# Patient Record
Sex: MALE | Race: BLACK OR AFRICAN AMERICAN | Employment: OTHER | URBAN - METROPOLITAN AREA
[De-identification: names, ages, dates, MRNs, and addresses within clinical notes are randomized per-mention and may not be internally consistent; named-entity substitution may affect disease eponyms.]

---

## 2017-04-04 ENCOUNTER — HOSPITAL ENCOUNTER (OUTPATIENT)
Dept: GENERAL RADIOLOGY | Age: 82
Discharge: HOME | End: 2017-04-04
Attending: UROLOGY | Admitting: UROLOGY

## 2017-04-04 LAB — PROSTATE SPECIFIC ANTIGEN: 3.69 NG/ML (ref 0–4)

## 2020-01-28 LAB
ALBUMIN SERPL-MCNC: 3.9 G/DL
ALP BLD-CCNC: 88 U/L
ALT SERPL-CCNC: 10 U/L
ANION GAP SERPL CALCULATED.3IONS-SCNC: NORMAL MMOL/L
AST SERPL-CCNC: 20 U/L
BILIRUB SERPL-MCNC: 0.6 MG/DL (ref 0.1–1.4)
BUN BLDV-MCNC: 21 MG/DL
CALCIUM SERPL-MCNC: 9.4 MG/DL
CHLORIDE BLD-SCNC: 100 MMOL/L
CHOLESTEROL, TOTAL: 170 MG/DL
CHOLESTEROL/HDL RATIO: NORMAL
CO2: 24 MMOL/L
CREAT SERPL-MCNC: 1.6 MG/DL
CREATININE, URINE: 187.2
GFR CALCULATED: 35
GLUCOSE BLD-MCNC: 144 MG/DL
HDLC SERPL-MCNC: 49 MG/DL (ref 35–70)
LDL CHOLESTEROL CALCULATED: 95 MG/DL (ref 0–160)
MICROALBUMIN/CREAT 24H UR: 183 MG/G{CREAT}
MICROALBUMIN/CREAT UR-RTO: NORMAL
POTASSIUM SERPL-SCNC: 4 MMOL/L
SODIUM BLD-SCNC: 138 MMOL/L
TOTAL PROTEIN: 6.8
TRIGL SERPL-MCNC: 130 MG/DL
VLDLC SERPL CALC-MCNC: 26 MG/DL

## 2020-02-19 RX ORDER — NABUMETONE 500 MG/1
500 TABLET, FILM COATED ORAL 2 TIMES DAILY
COMMUNITY
End: 2020-03-17

## 2020-02-19 RX ORDER — FUROSEMIDE 20 MG/1
1 TABLET ORAL DAILY
COMMUNITY

## 2020-02-19 RX ORDER — LEVOTHYROXINE SODIUM 0.05 MG/1
1 TABLET ORAL DAILY
COMMUNITY

## 2020-02-19 RX ORDER — AMLODIPINE BESYLATE 10 MG/1
10 TABLET ORAL DAILY
COMMUNITY
End: 2020-03-17

## 2020-02-21 ENCOUNTER — TELEPHONE (OUTPATIENT)
Dept: CARDIOLOGY CLINIC | Age: 85
End: 2020-02-21

## 2020-03-09 ENCOUNTER — INITIAL CONSULT (OUTPATIENT)
Dept: CARDIOLOGY CLINIC | Age: 85
End: 2020-03-09
Payer: COMMERCIAL

## 2020-03-09 ENCOUNTER — HOSPITAL ENCOUNTER (EMERGENCY)
Age: 85
Discharge: HOME | End: 2020-03-10
Attending: EMERGENCY MEDICINE
Payer: COMMERCIAL

## 2020-03-09 ENCOUNTER — APPOINTMENT (OUTPATIENT)
Dept: CT IMAGING | Age: 85
End: 2020-03-09
Payer: COMMERCIAL

## 2020-03-09 VITALS
BODY MASS INDEX: 24.05 KG/M2 | SYSTOLIC BLOOD PRESSURE: 150 MMHG | HEART RATE: 75 BPM | WEIGHT: 187.4 LBS | HEIGHT: 74 IN | DIASTOLIC BLOOD PRESSURE: 80 MMHG

## 2020-03-09 PROBLEM — I50.32 CHRONIC DIASTOLIC CONGESTIVE HEART FAILURE (HCC): Status: ACTIVE | Noted: 2020-03-09

## 2020-03-09 PROCEDURE — 93000 ELECTROCARDIOGRAM COMPLETE: CPT | Performed by: INTERNAL MEDICINE

## 2020-03-09 PROCEDURE — 72125 CT NECK SPINE W/O DYE: CPT

## 2020-03-09 PROCEDURE — 6370000000 HC RX 637 (ALT 250 FOR IP): Performed by: EMERGENCY MEDICINE

## 2020-03-09 PROCEDURE — 99284 EMERGENCY DEPT VISIT MOD MDM: CPT

## 2020-03-09 PROCEDURE — 99204 OFFICE O/P NEW MOD 45 MIN: CPT | Performed by: INTERNAL MEDICINE

## 2020-03-09 PROCEDURE — 6370000000 HC RX 637 (ALT 250 FOR IP): Performed by: PHYSICIAN ASSISTANT

## 2020-03-09 PROCEDURE — 70450 CT HEAD/BRAIN W/O DYE: CPT

## 2020-03-09 RX ORDER — CARVEDILOL 12.5 MG/1
12.5 TABLET ORAL 2 TIMES DAILY WITH MEALS
Qty: 60 TABLET | Refills: 3 | Status: SHIPPED | OUTPATIENT
Start: 2020-03-09 | End: 2020-03-10

## 2020-03-09 RX ORDER — CARVEDILOL 6.25 MG/1
6.25 TABLET ORAL 2 TIMES DAILY WITH MEALS
COMMUNITY
End: 2020-03-09

## 2020-03-09 RX ORDER — ACETAMINOPHEN 325 MG/1
650 TABLET ORAL EVERY 6 HOURS PRN
Qty: 120 TABLET | Refills: 0 | Status: SHIPPED | OUTPATIENT
Start: 2020-03-09 | End: 2020-05-04

## 2020-03-09 RX ORDER — LOSARTAN POTASSIUM 100 MG/1
100 TABLET ORAL DAILY
Qty: 90 TABLET | Refills: 2 | Status: SHIPPED | OUTPATIENT
Start: 2020-03-09

## 2020-03-09 RX ORDER — ACETAMINOPHEN 500 MG
1000 TABLET ORAL ONCE
Status: COMPLETED | OUTPATIENT
Start: 2020-03-09 | End: 2020-03-09

## 2020-03-09 RX ORDER — IBUPROFEN 600 MG/1
600 TABLET ORAL ONCE
Status: COMPLETED | OUTPATIENT
Start: 2020-03-09 | End: 2020-03-09

## 2020-03-09 RX ORDER — LOSARTAN POTASSIUM 50 MG/1
50 TABLET ORAL DAILY
COMMUNITY
End: 2020-03-09

## 2020-03-09 RX ORDER — IBUPROFEN 600 MG/1
600 TABLET ORAL 4 TIMES DAILY PRN
Qty: 360 TABLET | Refills: 1 | Status: SHIPPED | OUTPATIENT
Start: 2020-03-09 | End: 2020-05-04

## 2020-03-09 RX ADMIN — ACETAMINOPHEN 1000 MG: 500 TABLET ORAL at 21:25

## 2020-03-09 RX ADMIN — IBUPROFEN 600 MG: 600 TABLET ORAL at 23:21

## 2020-03-09 ASSESSMENT — PAIN DESCRIPTION - PAIN TYPE
TYPE: ACUTE PAIN
TYPE: ACUTE PAIN

## 2020-03-09 ASSESSMENT — PAIN SCALES - GENERAL
PAINLEVEL_OUTOF10: 1
PAINLEVEL_OUTOF10: 10

## 2020-03-09 ASSESSMENT — PAIN DESCRIPTION - LOCATION: LOCATION: HEAD

## 2020-03-09 ASSESSMENT — PAIN DESCRIPTION - ORIENTATION: ORIENTATION: POSTERIOR

## 2020-03-09 NOTE — LETTER
CLINICAL STAFF DOCUMENTATION    Blackman Janice  2/2/1921  [id number]    Have you had any Chest Pain - No    Have you had any Shortness of Breath - Yes  If Yes - When on exertion    Have you had any dizziness - No      Have you had any palpitations - No      Do you have any edema - No    Do you have a surgery or procedure scheduled in the near future - No

## 2020-03-09 NOTE — PROGRESS NOTES
01/28/2020    BILIDIR 1.0 (H) 12/06/2014    BILITOT 0.6 01/28/2020    ALKPHOS 88 01/28/2020     Lab Results   Component Value Date    PROBNP 943.6 (H) 12/08/2017     Lab Results   Component Value Date    LABA1C 5.7 11/30/2014    LABA1C 6.1 03/28/2012     Lab Results   Component Value Date    WBC 7.0 12/08/2017    HGB 12.4 (L) 12/08/2017    HCT 38.1 (L) 12/08/2017     12/08/2017     Echo 1/24/2020/ [place]    Conclusions:    Moderate left ventricular hypertrophy. The ejection fraction, measured in 2D mode, is 61 %. Grade 3 diastolic dysfunction (restrictive LV filling pattern). Moderate to severe enlargement of the left atrium. Mild mitral annular calcification. Mild mitral valve leaflet calcification. No significant regurgitation or stenosis. Aortic valve sclerosis. Mild thickening of the aortic cusps. Moderate aortic cusp calcification. Mild to moderate aortic stenosis. Calculated valve area 1.3 cm2.  No prior echocardiogram available for comparison.              All labs, medications and tests reviewed by myself including data and history from outside source , patient and available family . Assessment & Plan:      1. Hypertension, unspecified type    2. Essential hypertension, benign    3. Chronic diastolic congestive heart failure (HCC)         Essential hypertension, benign  Patient is still quite elevated about advised him to increase his carvedilol 25 mg twice daily and losartan 100 mg daily check BMP in 1 week time after he is increased doses. Echo shows lvh  Keep an eye on blood pressure at home avoid salt call me with numbers in about 1 to 2 weeks    Chronic diastolic congestive heart failure ([address]  He takes Lasix 20 mg every day he seems to be doing all right with that advised to make sure that he takes potassium with check BMP in 1 week time may benefit from Aldactone     Dyslipidemia :  All available lab work was reviewed. Patient was advised to repeat lab work before next visit. Necessary orders were placed , instructions given by myself       Counseled extensively and medication compliance urged. We discussed that for the  prevention of ASCVD our  goal is aggressive risk modification. Patient is encouraged to exercise even a brisk walk for 30 minutes  at least 3 to 4 times a week   Various goals were discussed and questions answered. Continue current medications. Appropriate prescriptions are addressed and refills ordered. Questions answered and patient verbalizes understanding. Call for any problems, questions, or concerns. Continue all other medications of all above medical condition listed as is. Return in about 1 month (around 4/9/2020). Please note this report has been partially produced using speech recognition software and may contain errors related to that system including errors in grammar, punctuation, and spelling, as well as words and phrases that may be inappropriate. If there are any questions or concerns please feel free to contact the dictating provider for clarification.

## 2020-03-10 VITALS
HEART RATE: 69 BPM | HEIGHT: 73 IN | BODY MASS INDEX: 23.99 KG/M2 | TEMPERATURE: 97.6 F | WEIGHT: 181 LBS | SYSTOLIC BLOOD PRESSURE: 178 MMHG | RESPIRATION RATE: 18 BRPM | DIASTOLIC BLOOD PRESSURE: 97 MMHG | OXYGEN SATURATION: 98 %

## 2020-03-10 RX ORDER — CARVEDILOL 12.5 MG/1
12.5 TABLET ORAL 2 TIMES DAILY WITH MEALS
Qty: 180 TABLET | Refills: 3 | Status: SHIPPED | OUTPATIENT
Start: 2020-03-10 | End: 2020-03-17

## 2020-03-10 NOTE — ED PROVIDER NOTES
Emergency [address] EMERGENCY DEPARTMENT    Patient: Nica Anton  MRN: [id number]  : 1921  Date of Evaluation: 3/9/2020  ED Provider: José Miguel Pollock MD    Chief Complaint       Chief Complaint   Patient presents with    Fall     Patient tripped and fell while walking to the bathroom. Having neck pain since. Patient admits to hitting his head. Lakisha Don is a 80 y.o. male who presents to the emergency department after a trip and fall while at home about two hours prior to arrival. No Loc. The patient states that he had no chest pain, palpitations, presyncopal symptoms prior to his fall. He states that he stood up quickly and while he was walking across the room he lost his balance and fell. The patient states that he has a mild headache but denies numbness, weakness, tingling, vision change or hearing change. No nausea or vomiting. No LOC and no nausea or vomiting since his fall. ROS:     At least 10 systems reviewed and otherwise acutely negative except as in the [address] Past History     Past Medical History:   Diagnosis Date    Diabetes mellitus ([address]     Hypertension      Past Surgical History:   Procedure Laterality Date    CHOLECYSTECTOMY  14    attempted lap, open    HERNIA REPAIR      PROSTATE SURGERY       Social History     Socioeconomic History    Marital status:       Spouse name: None    Number of children: None    Years of education: None    Highest education level: None   Occupational History    None   Social Needs    Financial resource strain: None    Food insecurity     Worry: None     Inability: None    Transportation needs     Medical: None     Non-medical: None   Tobacco Use    Smoking status: Never Smoker    Smokeless tobacco: Never Used   Substance and Sexual Activity    Alcohol use: No    Drug use: No    Sexual activity: Not Currently   Lifestyle    Physical activity Days per week: None     Minutes per session: None    Stress: None   Relationships    Social connections     Talks on phone: None     Gets together: None     Attends Evangelical service: None     Active member of club or organization: None     Attends meetings of clubs or organizations: None     Relationship status: None    Intimate partner violence     Fear of current or ex partner: None     Emotionally abused: None     Physically abused: None     Forced sexual activity: None   Other Topics Concern    None   Social History Narrative    None       Medications/Allergies     Previous Medications    AMLODIPINE (NORVASC) 10 MG TABLET    Take 10 mg by mouth daily    BICALUTAMIDE (CASODEX) 50 MG CHEMO TABLET    Take 50 mg by mouth daily. CARVEDILOL (COREG) 12.5 MG TABLET    Take 1 tablet by mouth 2 times daily (with meals)    CHOLECALCIFEROL (VITAMIN D3) 2000 UNITS CAPS    Take 1,000 Units by mouth daily. FUROSEMIDE (LASIX) 20 MG TABLET    Take 1 tablet by mouth daily     GLIMEPIRIDE (AMARYL) 2 MG TABLET    Take 1 mg by mouth every morning (before breakfast). HYDRALAZINE (APRESOLINE) 25 MG TABLET    Take 25 mg by mouth 2 times daily. HYDROCODONE-ACETAMINOPHEN (NORCO) 5-325 MG PER TABLET    Take 1 tablet by mouth every 4 hours as needed for Pain    LEVOTHYROXINE (SYNTHROID) 50 MCG TABLET    Take 1 tablet by mouth Daily     LOSARTAN (COZAAR) 100 MG TABLET    Take 1 tablet by mouth daily    NABUMETONE (RELAFEN) 500 MG TABLET    Take 500 mg by mouth 2 times daily    NEBIVOLOL (BYSTOLIC) 5 MG TABLET    Take 10 mg by mouth daily. POTASSIUM CHLORIDE SA (K-DUR;KLOR-CON M) 20 MEQ TABLET    Take 0.5 tablets by mouth daily for 7 days. REPAGLINIDE (PRANDIN) 1 MG TABLET    Take 0.5 mg by mouth 2 times daily (before meals). SIMVASTATIN (ZOCOR) 20 MG TABLET    Take 20 mg by mouth daily     TAMSULOSIN (FLOMAX) 0.4 MG CAPSULE    Take 0.4 mg by mouth daily.        No Known Allergies     Physical Exam       ED Triage Vitals [03/09/20 2041]   BP Temp Temp Source Pulse Resp SpO2 Height Weight   (!) 204/110 97.6 °F (36.4 °C) Oral 81 18 98 % 6' 1\" (1.854 m) 181 lb (82.1 kg)     GENERAL APPEARANCE: Awake and alert. Cooperative. No acute distress. HEAD: Normocephalic. Atraumatic. EYES: Sclera anicteric. ENT: Tolerates saliva. No trismus. No tenderness to palpation of the C-spine no step-offs or deformities  NECK: Supple. Trachea midline. CARDIO: RRR. Radial pulse 2+. LUNGS: Respirations unlabored. CTAB. ABDOMEN: Soft. Non-distended. Non-tender. EXTREMITIES: No acute deformities. SKIN: Warm and dry. NEUROLOGICAL: No gross facial drooping. Moves all 4 extremities spontaneously. Cranial nerves II through XII are grossly intact, 5 out of 5 strength in the bilateral upper extremities, sensation intact throughout, negative pronator drift, alert and oriented  PSYCHIATRIC: Normal mood. Diagnostics   Labs:  No results found for this visit on 03/09/20. Radiographs:  Ct Head Wo Contrast    Result Date: 3/9/2020  EXAMINATION: CT OF THE HEAD WITHOUT CONTRAST  3/9/2020 9:03 pm TECHNIQUE: CT of the head was performed without the administration of intravenous contrast. Dose modulation, iterative reconstruction, and/or weight based adjustment of the mA/kV was utilized to reduce the radiation dose to as low as reasonably achievable. COMPARISON: None. HISTORY: ORDERING SYSTEM PROVIDED HISTORY: head pain TECHNOLOGIST PROVIDED HISTORY: Has a \"code stroke\" or \"stroke alert\" been called? ->No Reason for exam:->head pain Reason for Exam: falling in bathroom hitting the back of his head. Acuity: Acute Type of Exam: Initial Mechanism of Injury: fall Relevant Medical/Surgical History: no sx FINDINGS: BRAIN/VENTRICLES: There is no acute intracranial hemorrhage, mass effect or midline shift. No abnormal extra-axial fluid collection. The gray-white differentiation is maintained without evidence of an acute infarct.   There is no 80 y.o. male who presented to the emergency department after a mechanical ground-level fall. Patient's physical exam is reassuring with no evidence of trauma at this time and his head CT and C-spine CT are negative. At this time I feel that the patient is safe for discharge home with his family with close follow-up and return precautions. They expressed an understanding and he was discharged home in a stable condition. I did recommend they follow-up with his primary care given that he had the fall for balance assessment and to ensure that he has the safest home environment possible. ED Medication Orders (From admission, onward)    Start Ordered     Status Ordering Provider    03/09/20 2315 03/09/20 2306  ibuprofen (ADVIL;MOTRIN) tablet 600 mg  ONCE      Last MAR action:  Given - by Jaye Limon on 03/09/20 at [address] [facility]    03/09/20 2100 03/09/20 2047  acetaminophen (TYLENOL) tablet 1,000 mg  ONCE      Last MAR action:  Given - by Jaye Limon on 03/09/20 at [address] [facility] [place]          Final Impression      1. Fall, initial encounter    2. Closed head injury, initial encounter    3.  Neck sprain, initial encounter      DISPOSITION Decision To Discharge 03/09/2020 11:43:31 PM     (Please note that portions of this note may have been completed with a voice recognition program. Efforts were made to edit the dictations but occasionally words are mis-transcribed.)    Damir Parish MD  [id number] MD  03/13/20 1733

## 2020-03-10 NOTE — ED PROVIDER NOTES
As provider-in-triage, I performed a medical screening history and physical exam on this patient. HISTORY OF PRESENT ILLNESS  Kajal Acuna is a 80 y.o. male who presents for fall, head injury. States he stood up to fast and fell trying to get to the bathroom. Notes head and neck pain only. No LOC. Not on thinners. Acting normally per family. PHYSICAL EXAM  BP (!) 204/110   Pulse 81   Temp 97.6 °F (36.4 °C) (Oral)   Resp 18   Ht 6' 1\" (1.854 m)   Wt 181 lb (82.1 kg)   SpO2 98%   BMI 23.88 kg/m²     On exam, the patient appears well-hydrated, well-nourished, and in no acute distress. Mucous membranes are moist. Speech is clear. Breathing is unlabored. Skin is dry. Mental status is normal. Moves all extremities, and is without facial droop. Comment: Please note this report has been produced using speech recognition software and may contain errors related to that system including errors in grammar, punctuation, and spelling, as well as words and phrases that may be inappropriate. If there are any questions or concerns please feel free to contact the dictating provider for clarification.        [address] [facility]  03/09/20 2868

## 2020-03-16 ENCOUNTER — TELEPHONE (OUTPATIENT)
Dept: CARDIOLOGY CLINIC | Age: 85
End: 2020-03-16

## 2020-03-16 NOTE — TELEPHONE ENCOUNTER
Pt daughter called and states pt bp med was increased. She was to call if bp stayed in over 150 top number. It is staying in 160 or higher please advise.  Call [phone] Cyrus Montes

## 2020-03-17 RX ORDER — CARVEDILOL 25 MG/1
25 TABLET ORAL 2 TIMES DAILY WITH MEALS
Qty: 60 TABLET | Refills: 3 | Status: SHIPPED | OUTPATIENT
Start: 2020-03-17 | End: 2020-03-18

## 2020-03-17 RX ORDER — NIFEDIPINE 30 MG/1
30 TABLET, EXTENDED RELEASE ORAL DAILY
Qty: 30 TABLET | Refills: 2 | Status: SHIPPED | OUTPATIENT
Start: 2020-03-17 | End: 2020-03-18

## 2020-03-17 NOTE — TELEPHONE ENCOUNTER
Increase coreg 25 mg bid  Start nifedipne Xr 30 Mg daily   Stay at home / avoid public exposure  Call us with bp in 1 -2 weeks

## 2020-03-20 RX ORDER — CARVEDILOL 25 MG/1
25 TABLET ORAL 2 TIMES DAILY WITH MEALS
Qty: 180 TABLET | Refills: 3 | Status: ON HOLD | OUTPATIENT
Start: 2020-03-20 | End: 2021-03-29 | Stop reason: SDUPTHER

## 2020-03-20 RX ORDER — NIFEDIPINE 30 MG/1
30 TABLET, EXTENDED RELEASE ORAL DAILY
Qty: 90 TABLET | Refills: 3 | Status: ON HOLD | OUTPATIENT
Start: 2020-03-20 | End: 2021-03-29 | Stop reason: HOSPADM

## 2020-05-04 ENCOUNTER — OFFICE VISIT (OUTPATIENT)
Dept: CARDIOLOGY CLINIC | Age: 85
End: 2020-05-04
Payer: COMMERCIAL

## 2020-05-04 VITALS
HEIGHT: 73 IN | HEART RATE: 60 BPM | WEIGHT: 185 LBS | SYSTOLIC BLOOD PRESSURE: 106 MMHG | DIASTOLIC BLOOD PRESSURE: 60 MMHG | BODY MASS INDEX: 24.52 KG/M2

## 2020-05-04 PROCEDURE — 99213 OFFICE O/P EST LOW 20 MIN: CPT | Performed by: INTERNAL MEDICINE

## 2020-05-04 NOTE — LETTER
Eliceo Montenegro  2/2/1921  [id number]    Have you had any Chest Pain - No    Have you had any Shortness of Breath - No    Have you had any dizziness - No    Have you had any palpitations - No     Do you have any edema - No    Ask patient if they want to sign up for MyChart if they are not already signed up    Check to see if we have an E-MAIL on file for the patient    Check medication list thoroughly!!!  BE SURE TO ASK PATIENT IF THEY NEED MEDICATION REFILLS

## 2020-05-04 NOTE — PROGRESS NOTES
pertinent family history. Social History     Tobacco Use    Smoking status: Never Smoker    Smokeless tobacco: Never Used   Substance Use Topics    Alcohol use: No        Review of Systems:   · Constitutional: No Fever or Weight Loss   · Eyes: No Decreased Vision  · ENT: No Headaches, Hearing Loss or Vertigo  · Cardiovascular: as per note above   · Respiratory: No cough or wheezing and as per note above. · Gastrointestinal: No abdominal pain, appetite loss, blood in stools, constipation, diarrhea or heartburn  · Genitourinary: No dysuria, trouble voiding, or hematuria  · Musculoskeletal:  denies any new  joint aches , swelling  or pain   · Integumentary: No rash or pruritis  · Neurological: No TIA or stroke symptoms  · Psychiatric: No anxiety or depression  · Endocrine: No malaise, fatigue or temperature intolerance  · Hematologic/Lymphatic: No bleeding problems, blood clots or swollen lymph nodes  · Allergic/Immunologic: No nasal congestion or hives    Objective:      Physical Exam:  /60   Pulse 60   Ht 6' 1\" (1.854 m)   Wt 185 lb (83.9 kg)   BMI 24.41 kg/m²   Wt Readings from Last 3 Encounters:   05/04/20 185 lb (83.9 kg)   03/09/20 181 lb (82.1 kg)   03/09/20 187 lb 6.4 oz (85 kg)     Body mass index is 24.41 kg/m². Vitals:    05/04/20 1404   BP: 106/60   Pulse: 60        General Appearance:  No distress, conversant  Constitutional:  Well developed, Well nourished, No acute distress, Non-toxic appearance. HENT:  Normocephalic, Atraumatic, Bilateral external ears normal, Oropharynx moist, No oral exudates, Nose normal. Neck- Normal range of motion, No tenderness, Supple, No stridor,no apical-carotid delay  Eyes:  PERRL, EOMI, Conjunctiva normal, No discharge. Respiratory:  Normal breath sounds, No respiratory distress, No wheezing, No chest tenderness. ,no use of accessory muscles, NO crackles  Cardiovascular: (PMI) apex non displaced,no lifts no thrills,S1 and S2 audible, No added heart mitral valve leaflet calcification. No significant regurgitation or stenosis. Aortic valve sclerosis. Mild thickening of the aortic cusps. Moderate aortic cusp calcification. Mild to moderate aortic stenosis. Calculated valve area 1.3 cm2.  No prior echocardiogram available for comparison.          All labs, medications and tests reviewed by myself including data and history from outside source , patient and available family . Assessment & Plan:      1. Essential hypertension, benign    2. Chronic diastolic congestive heart failure ([address]    3. Acute cholecystitis         Essential hypertension, benign  Patient is still quite elevated about advised him to increase his carvedilol 25 mg twice daily and losartan 100 mg daily check BMP in 1 week time after he is increased doses. Echo shows lvh  Keep an eye on blood pressure at home avoid salt call me with numbers in about 1 to 2 weeks    Chronic diastolic congestive heart failure ([facility]  He takes Lasix 20 mg every day he seems to be doing all right with that advised to make sure that he takes potassium with check BMP in 1 week time may benefit from Aldactone     Dyslipidemia :  All available lab work was reviewed. Patient was advised to repeat lab work before next visit. Necessary orders were placed , instructions given by myself       Counseled extensively and medication compliance urged. We discussed that for the  prevention of ASCVD our  goal is aggressive risk modification. Patient is encouraged to exercise even a brisk walk for 30 minutes  at least 3 to 4 times a week   Various goals were discussed and questions answered. Continue current medications. Appropriate prescriptions are addressed and refills ordered. Questions answered and patient verbalizes understanding. Call for any problems, questions, or concerns. Continue all other medications of all above medical condition listed as is. Return in about 6 months (around 11/4/2020).     Please note this

## 2020-09-11 ENCOUNTER — HOSPITAL ENCOUNTER (EMERGENCY)
Age: 85
Discharge: HOME | End: 2020-09-11
Attending: EMERGENCY MEDICINE
Payer: COMMERCIAL

## 2020-09-11 ENCOUNTER — APPOINTMENT (OUTPATIENT)
Dept: CT IMAGING | Age: 85
End: 2020-09-11
Payer: COMMERCIAL

## 2020-09-11 VITALS
DIASTOLIC BLOOD PRESSURE: 87 MMHG | RESPIRATION RATE: 16 BRPM | OXYGEN SATURATION: 98 % | TEMPERATURE: 98.2 F | HEART RATE: 58 BPM | SYSTOLIC BLOOD PRESSURE: 175 MMHG

## 2020-09-11 PROCEDURE — 93005 ELECTROCARDIOGRAM TRACING: CPT | Performed by: EMERGENCY MEDICINE

## 2020-09-11 PROCEDURE — 72125 CT NECK SPINE W/O DYE: CPT

## 2020-09-11 PROCEDURE — 99284 EMERGENCY DEPT VISIT MOD MDM: CPT

## 2020-09-11 PROCEDURE — 6370000000 HC RX 637 (ALT 250 FOR IP): Performed by: EMERGENCY MEDICINE

## 2020-09-11 RX ORDER — LIDOCAINE 4 G/G
1 PATCH TOPICAL DAILY
Qty: 10 PATCH | Refills: 0 | Status: SHIPPED | OUTPATIENT
Start: 2020-09-11 | End: 2020-09-21

## 2020-09-11 RX ORDER — LIDOCAINE 4 G/G
1 PATCH TOPICAL DAILY
Status: DISCONTINUED | OUTPATIENT
Start: 2020-09-11 | End: 2020-09-11 | Stop reason: HOSPADM

## 2020-09-11 ASSESSMENT — PAIN DESCRIPTION - PAIN TYPE: TYPE: ACUTE PAIN

## 2020-09-11 ASSESSMENT — PAIN SCALES - GENERAL: PAINLEVEL_OUTOF10: 5

## 2020-09-11 ASSESSMENT — PAIN DESCRIPTION - LOCATION: LOCATION: NECK

## 2020-09-11 NOTE — ED PROVIDER NOTES
Emergency Department Encounter    Patient: Madison Shepherd  MRN: [id number]  : 1921  Date of Evaluation: 2020  ED Provider:  Hilario Martin    Triage Chief Complaint:   Neck Pain (ongoing since fall in march)      [place]:  Madison Shepherd is a 80 y.o. male that presents to the emergency department for evaluation of left-sided neck pain. He is accompanied by his daughter who is at bedside and provides additional history. Patient notes that in March he had a mechanical fall while at home. He was walking across his home when he lost balance and fell. He did sustain blunt head trauma at that time. He was evaluated in our emergency department. CT brain and cervical spine were obtained and were essentially unremarkable. Patient was discharged in hemodynamically stable condition. Over the past 6 months, patient has had waxing and waning pain in the left side of his neck. He denies any radiating symptoms to the extremities, back, chest. He states that pain is worse in the morning and gets better throughout the day. He does describe improvement in symptoms with movement. He is able to move his neck and full range of motion. Daughter applies Biofreeze which makes the pain better as well. Patient denies pain or injury elsewhere. Denies chest pain, shortness of breath, pleuritic pain. Denies abdominal pain, nausea, vomiting, diarrhea, constipation, hematochezia, melena. Denies syncope, dizziness, lightheadedness, numbness, tingling, weakness, paresthesias. Denies loss of bowel bladder function or urinary retention. Denies saddle anesthesia. Denies additional precipitating, modifying, alleviating factors. He denies any repeat trauma. ROS - see HPI, below listed is current ROS at time of my eval:  A complete 10 point review of systems was performed and is as dictated above, otherwise negative.      Past Medical History:   Diagnosis Date    Diabetes mellitus ([address]     Hypertension        Past Surgical History:   Procedure Laterality Date    CHOLECYSTECTOMY  12/2/14    attempted lap, open    HERNIA REPAIR      PROSTATE SURGERY         History reviewed. No pertinent family history. Social History     Socioeconomic History    Marital status:       Spouse name: Not on file    Number of children: Not on file    Years of education: Not on file    Highest education level: Not on file   Occupational History    Not on file   Social Needs    Financial resource strain: Not on file    Food insecurity     Worry: Not on file     Inability: Not on file    Transportation needs     Medical: Not on file     Non-medical: Not on file   Tobacco Use    Smoking status: Never Smoker    Smokeless tobacco: Never Used   Substance and Sexual Activity    Alcohol use: No    Drug use: No    Sexual activity: Not Currently   Lifestyle    Physical activity     Days per week: Not on file     Minutes per session: Not on file    Stress: Not on file   Relationships    Social connections     Talks on phone: Not on file     Gets together: Not on file     Attends Oriental orthodox service: Not on file     Active member of club or organization: Not on file     Attends meetings of clubs or organizations: Not on file     Relationship status: Not on file    Intimate partner violence     Fear of current or ex partner: Not on file     Emotionally abused: Not on file     Physically abused: Not on file     Forced sexual activity: Not on file   Other Topics Concern    Not on file   Social History Narrative    Not on file       Current Facility-Administered Medications   Medication Dose Route Frequency Provider Last Rate Last Dose    lidocaine 4 % external patch 1 patch  1 patch Transdermal Daily Henry Eduardo DO   1 patch at 09/11/20 0979     Current Outpatient Medications   Medication Sig Dispense Refill    lidocaine 4 % external patch Place 1 patch onto the skin daily for 10 days 10 patch 0    carvedilol (COREG) 25 MG tablet Take 1 tablet by mouth 2 times daily (with meals) 180 tablet 3    NIFEdipine (PROCARDIA XL) 30 MG extended release tablet Take 1 tablet by mouth daily 90 tablet 3    losartan (COZAAR) 100 MG tablet Take 1 tablet by mouth daily 90 tablet 2    furosemide (LASIX) 20 MG tablet Take 1 tablet by mouth daily       levothyroxine (SYNTHROID) 50 MCG tablet Take 1 tablet by mouth Daily       Cholecalciferol (VITAMIN D3) 2000 UNITS CAPS Take 1,000 Units by mouth daily.  simvastatin (ZOCOR) 20 MG tablet Take 20 mg by mouth daily       tamsulosin (FLOMAX) 0.4 MG capsule Take 0.4 mg by mouth daily. No Known Allergies    Nursing Notes Reviewed    Physical Exam:  Triage VS:    ED Triage Vitals [09/11/20 1448]   Enc Vitals Group      BP (!) 158/75      Pulse 61      Resp 16      Temp 98.2 °F (36.8 °C)      SpO2 98 %       My pulse ox interpretation is - normal    General appearance:  Patient is awake, alert, oriented. Following commands and answering questions. GCS is 15. Non-toxic in appearance. Skin:  Warm. Dry. Intact. No rashes, petechiae, purpura. No herpes zoster lesions. Eye:  Pupils are equal, round, reactive. Extraocular movements intact. No nystagmus. No gaze deviation. Head, ears, nose, mouth and throat:  Head is normocephalic, atraumatic. No external masses or lesions. No nasal drainage. Pharynx is clear, non-erythematous. Airway is patent. Mucous membranes are moist.   Neck:  Supple. No nuchal rigidity. Trachea midline. No masses, thyromegaly or lymphadenopathy. No JVD. No carotid thrills or bruits. Examination of the cervical spine demonstrates paraspinal muscle tenderness in the lower cervical spine. There are no step-offs or deformities. Extremity:  No clubbing, cyanosis, or edema. No joint swelling. Normal muscle tone. Full range of motion in extremities. Patient is able to move bilateral shoulders, elbows, wrists, fingers and full range of motion.   Heart:  Regular rate and regular rhythm. Audible S1 & S2. No audible murmurs, rubs, or gallops. Perfusion:  Symmetric peripheral pulses. Brisk capillary refill. Respiratory:  Lungs clear to auscultation bilaterally. No rales, rhonchi or wheezes. Respirations nonlabored. Abdominal:  Soft. Nontender. Non distended. Normal bowel sounds. No midline pulsatile abdominal masses, thrills or bruits. Back:  No CVA tenderness to palpation. Apart from the cervical spine, patient has no tenderness in the thoracic or lumbar spine. There are no step-offs or deformities. No clinical signs of cauda equina syndrome. Neurological:  Alert and oriented times 3. No focal or lateralizing neurological deficits. Sensation is grossly intact to light touch and two-point discrimination in the C5-T1 dermatomal distribution of bilateral upper extremities. 5 out of 5 strength in muscles innervated by the radial, median, ulnar, AIN, PIN nerves. 2/4 biceps, triceps, brachioradialis reflexes. Psychiatric: Cooperative.       *I have reviewed and interpreted all of the currently available results from this visit*    Labs  Results for orders placed or performed during the hospital encounter of 09/11/20   EKG 12 Lead   Result Value Ref Range    Ventricular Rate 63 BPM    Atrial Rate 63 BPM    P-R Interval 232 ms    QRS Duration 88 ms    Q-T Interval 428 ms    QTc Calculation (Bazett) 437 ms    P Axis 73 degrees    R Axis -41 degrees    T Axis 31 degrees    Diagnosis       Sinus rhythm with 1st degree AV block  Left axis deviation  Septal infarct , age undetermined  Abnormal ECG  When compared with ECG of 08-DEC-2017 10:45,  Septal infarct is now present          Radiographs:  Ct Cervical Spine Wo Contrast    Result Date: 9/11/2020  EXAMINATION: CT OF THE CERVICAL SPINE WITHOUT CONTRAST 9/11/2020 5:24 pm TECHNIQUE: CT of the cervical spine was performed without the administration of intravenous contrast. Multiplanar reformatted images are provided for review. Dose modulation, iterative reconstruction, and/or weight based adjustment of the mA/kV was utilized to reduce the radiation dose to as low as reasonably achievable. COMPARISON: 03/09/2020 HISTORY: ORDERING SYSTEM PROVIDED HISTORY: left neck pain TECHNOLOGIST PROVIDED HISTORY: Reason for exam:->left neck pain Reason for Exam: left side neck pain Acuity: Acute Type of Exam: Initial Additional signs and symptoms: fell in march and neck still hurts FINDINGS: BONES/ALIGNMENT: There is no acute fracture or suspect osseous lesion. Vertebral body heights are maintained. There are confluent anterior osteophytes from C2-3 through the visualized upper thoracic spine compatible with diffuse idiopathic skeletal hyperostosis. Alignment is normal. DEGENERATIVE CHANGES: Mild multilevel degenerative changes unchanged from prior study. SOFT TISSUES: No acute paraspinal soft tissue abnormality. No acute osseous abnormality of the cervical spine. EKG: (All EKG's are interpreted by myself in the absence of a cardiologist) EKG performed on 9/11/2020 at 1726 demonstrates ventricular rate of 63 bpm in sinus rhythm. Patient does have first-degree AV block. Left axis deviation. No signs of acute ischemia, infarct, high degree heart block. Intervals are otherwise within normal limits. MDM:  Patient was seen and evaluated in the emergency department by myself. A thorough history and physical exam were performed, prior medical records were reviewed. Upon arrival to the emergency department, patient's vital signs were noted. He was hypertensive with a blood pressure of 175/87. Heart rate was slightly bradycardic at 58. No tachypnea or hypoxia. Patient was afebrile. Patient was connected to continuous cardiopulmonary monitoring. Rhythm strip interpreted by myself demonstrating sinus rhythm. EKG was performed, interpreted by myself, as detailed above. Differential diagnoses and treatment plan were discussed. Lidoderm patch was applied for pain. CT cervical spine was obtained and reviewed by myself. Radiology report reads no acute osseous abnormality of the cervical spine. On repeat evaluations, patient remains hemodynamically stable. Reports improvement in symptoms with therapy provided in the ED. Results of radiographic data along with differential diagnoses and treatment plan were discussed with the patient. Patient presents with neck pain. No recent traumatic injuries. There is no radiculopathy. No numbness, tingling, weakness in the upper extremities. No neurologic deficits. Patient denies any chest pain. He does note improvement in symptoms with neck motion and Lidoderm patch. He feels better and seeks discharge. He does have an appointment with his primary care physician Dr. Anny Mcclellan on 9/15/2020. Instructed to address elevated blood pressure at follow-up appointment. Patient has not taken his evening blood pressure medications. Instructed that if neck pain persists or worsen he may require MRI of his neck for further evaluation. No red flag signs or symptoms to warrant emergent MRI. Instructed to return to emergency room immediately with any new, worsening, concerning symptoms. Daughter is at bedside and is updated as well. Patient is provided prescription for Lidoderm patch. Side effect profile this medication is discussed. Daughter declines muscle relaxants or narcotic pain medications. Patient is discharged in hemodynamically stable condition. Clinical Impressions:  1. Cervicalgia    2. History of fall    3. Accelerated hypertension        Disposition referral:  Cammie Arriaga MD  [address] [place] [address],[address]  [phone]    On 9/15/2020  Please follow-up with your primary care physician Dr. Anny Mcclellan for reevaluation as scheduled. If symptoms persist or worsen you may require MRI.   Please address elevated blood pressure at follow-up appointment    [facility]) [facility] Emergency Department  [address] [id number]  [phone]  Go to   Immediately with any new, worsening, concerning symptoms. Disposition medications:  New Prescriptions    LIDOCAINE 4 % EXTERNAL PATCH    Place 1 patch onto the skin daily for 10 days       ED Provider Disposition  DISPOSITION: Discharge        Comment: Please note this report has been produced using speech recognition software and may contain errors related to that system including errors in grammar, punctuation, and spelling, as well as words and phrases that may be inappropriate. Efforts were made to edit the dictations.        [facility],   09/11/20 1421

## 2020-09-11 NOTE — ED NOTES
Pt reports neck pain, pain level 8. HX: Fall in March resulting in neck pain.         Lita Benavides RN  09/11/20 8869

## 2020-09-11 NOTE — ED NOTES
Reviewed discharge instructions and prescription with pt, pt V/U, discharged home with daughter.      Elizabeth Camp RN  09/11/20 9950

## 2020-09-12 PROCEDURE — 93010 ELECTROCARDIOGRAM REPORT: CPT | Performed by: INTERNAL MEDICINE

## 2020-09-16 LAB
EKG ATRIAL RATE: 63 BPM
EKG DIAGNOSIS: NORMAL
EKG P AXIS: 73 DEGREES
EKG P-R INTERVAL: 232 MS
EKG Q-T INTERVAL: 428 MS
EKG QRS DURATION: 88 MS
EKG QTC CALCULATION (BAZETT): 437 MS
EKG R AXIS: -41 DEGREES
EKG T AXIS: 31 DEGREES
EKG VENTRICULAR RATE: 63 BPM

## 2020-10-18 ENCOUNTER — APPOINTMENT (OUTPATIENT)
Dept: GENERAL RADIOLOGY | Age: 85
End: 2020-10-18
Payer: COMMERCIAL

## 2020-10-18 VITALS
BODY MASS INDEX: 24.64 KG/M2 | HEART RATE: 81 BPM | TEMPERATURE: 97.7 F | DIASTOLIC BLOOD PRESSURE: 112 MMHG | RESPIRATION RATE: 16 BRPM | OXYGEN SATURATION: 100 % | HEIGHT: 74 IN | SYSTOLIC BLOOD PRESSURE: 188 MMHG | WEIGHT: 192 LBS

## 2020-10-18 PROCEDURE — 84484 ASSAY OF TROPONIN QUANT: CPT

## 2020-10-18 PROCEDURE — 71045 X-RAY EXAM CHEST 1 VIEW: CPT

## 2020-10-18 RX ORDER — LABETALOL HYDROCHLORIDE 5 MG/ML
5 INJECTION, SOLUTION INTRAVENOUS ONCE
Status: DISCONTINUED | OUTPATIENT
Start: 2020-10-18 | End: 2020-10-18

## 2020-10-18 RX ORDER — LABETALOL HYDROCHLORIDE 5 MG/ML
10 INJECTION, SOLUTION INTRAVENOUS ONCE
Status: DISCONTINUED | OUTPATIENT
Start: 2020-10-18 | End: 2020-10-18

## 2020-10-19 ENCOUNTER — HOSPITAL ENCOUNTER (EMERGENCY)
Age: 85
Discharge: HOME | End: 2020-10-19
Attending: EMERGENCY MEDICINE
Payer: COMMERCIAL

## 2020-10-19 PROCEDURE — 99284 EMERGENCY DEPT VISIT MOD MDM: CPT

## 2020-10-19 NOTE — ED PROVIDER NOTES
Triage Chief Complaint:   Hypertension (family reports patients bp was 197/97 at 2100 tonight and has steadily ran high since earlier today. Denies any pain or symptoms.)    [place]:  Narciso Magana is a 80 y.o. male that presents with his daughter for hypertension. Patient denies any other symptoms. He has some muscular neck pain from a fall about a week ago. Denies any recent changes in blood pressure medications. He routinely checks his blood pressures and they have been running higher at home. Denies chest pain, shortness of breath, abdominal pain, nausea, vomiting, diarrhea, fever, cough, headache, vision changes. ROS:  At least 10 systems reviewed and otherwise acutely negative except as in the [address] Past Medical History:   Diagnosis Date    Diabetes mellitus ([facility] [address]     Hypertension      Past Surgical History:   Procedure Laterality Date    CHOLECYSTECTOMY  12/2/14    attempted lap, open    HERNIA REPAIR      PROSTATE SURGERY       No family history on file. Social History     Socioeconomic History    Marital status:       Spouse name: Not on file    Number of children: Not on file    Years of education: Not on file    Highest education level: Not on file   Occupational History    Not on file   Social Needs    Financial resource strain: Not on file    Food insecurity     Worry: Not on file     Inability: Not on file    Transportation needs     Medical: Not on file     Non-medical: Not on file   Tobacco Use    Smoking status: Never Smoker    Smokeless tobacco: Never Used   Substance and Sexual Activity    Alcohol use: No    Drug use: No    Sexual activity: Not Currently   Lifestyle    Physical activity     Days per week: Not on file     Minutes per session: Not on file    Stress: Not on file   Relationships    Social connections     Talks on phone: Not on file     Gets together: Not on file     Attends Jewish service: Not on file     Active member of club or organization: Not on file     Attends meetings of clubs or organizations: Not on file     Relationship status: Not on file    Intimate partner violence     Fear of current or ex partner: Not on file     Emotionally abused: Not on file     Physically abused: Not on file     Forced sexual activity: Not on file   Other Topics Concern    Not on file   Social History Narrative    Not on file     No current facility-administered medications for this encounter. Current Outpatient Medications   Medication Sig Dispense Refill    carvedilol (COREG) 25 MG tablet Take 1 tablet by mouth 2 times daily (with meals) 180 tablet 3    NIFEdipine (PROCARDIA XL) 30 MG extended release tablet Take 1 tablet by mouth daily 90 tablet 3    losartan (COZAAR) 100 MG tablet Take 1 tablet by mouth daily 90 tablet 2    furosemide (LASIX) 20 MG tablet Take 1 tablet by mouth daily       levothyroxine (SYNTHROID) 50 MCG tablet Take 1 tablet by mouth Daily       Cholecalciferol (VITAMIN D3) 2000 UNITS CAPS Take 1,000 Units by mouth daily.  simvastatin (ZOCOR) 20 MG tablet Take 20 mg by mouth daily       tamsulosin (FLOMAX) 0.4 MG capsule Take 0.4 mg by mouth daily. No Known Allergies    Nursing Notes Reviewed    Physical Exam:  ED Triage Vitals [10/18/20 2314]   Enc Vitals Group      BP (!) 188/112      Pulse 81      Resp 16      Temp 97.7 °F (36.5 °C)      Temp Source Skin      SpO2 100 %      Weight 192 lb (87.1 kg)      Height 6' 2\" (1.88 m)      Head Circumference       Peak Flow       Pain Score       Pain Loc       Pain Edu? Excl. in [address]? GENERAL APPEARANCE: Awake and alert. Cooperative. No acute distress. HEAD: Normocephalic. Atraumatic. EYES: EOM's grossly intact. Sclera anicteric. ENT: Mucous membranes are moist. Tolerates saliva. No trismus. NECK: No meningismus. HEART:  Extremities pink  LUNGS: Respirations unlabored. Even chest rise bilaterally  ABDOMEN: Non distended.    EXTREMITIES: No acute deformities. SKIN: Dry  NEUROLOGICAL: No gross facial drooping. Moves all 4 extremities spontaneously. PSYCHIATRIC: Normal mood. I have reviewed and interpreted all of the currently available lab results from this visit (if applicable):  No results found for this visit on 10/19/20. Radiographs (if obtained):  [] The following radiograph was interpreted by myself in the absence of a radiologist:  [x] Radiologist's Report Reviewed:    EKG (if obtained): (All EKG's are interpreted by myself in the absence of a cardiologist)  Sinus rhythm with first-degree AV block. Left axis deviation. No specific ST or T wave changes. No pathologic Q waves. QTc is normal.    MDM:  Plan of care is discussed thoroughly with the patient and family if present. If performed, all imaging and lab work also discussed with patient. All relevant prior results and chart reviewed if available. Patient presents with asymptomatic hypertension. The patient has no other complaints at this time consistent with an acute life-threatening emergency. Patient does not have any chest pain, shortness of breath, abdominal pain, hematuria, neurologic deficits. No indication for acute antihypertensive therapy as risks greatly outweigh benefits. I did encourage the patient to follow up closely with primary care physician in keep a daily log of blood pressures at home so that outpatient medications can be adjusted/initiated as needed by primary care physician. Patient verbalized understanding and agrees to return if any symptoms such as those listed above occurred. Clinical Impression:  1.  Asymptomatic hypertension      (Please note that portions of this note may have been completed with a voice recognition program. Efforts were made to edit the dictations but occasionally words are mis-transcribed.)    MD Miladys Wisdom MD  10/19/20 María Elena Diaz MD  10/19/20 María Elena Diaz MD  10/19/20 0124

## 2020-10-19 NOTE — ED NOTES
Pt being seen by Dr. Yessica Ramirez in virtual PIT at this time      Sonja Lofton, RN  10/18/20 1336

## 2020-10-19 NOTE — ED TRIAGE NOTES
Daughter reports patient has had elevated blood pressures for the past few hours. Patient denies any symptoms or pain at this time.

## 2020-10-19 NOTE — ED PROVIDER NOTES
As physician-in-triage, I performed a medical screening history and physical exam on this patient. HISTORY OF PRESENT ILLNESS  Marleny Murdock is a 80 y.o. male presents emergency department with chief complaint of hypertension. Patient does not report any symptoms. Patient reports blood pressure was 197/97 this evening. Patient reports blood pressure has been elevated all day. Patient is on multiple medications from his primary care physician for his hypertension. Patient will be seen by physicians at [facility]. PHYSICAL EXAM  BP (!) 188/112   Pulse 81   Temp 97.7 °F (36.5 °C) (Skin)   Resp 16   Ht 6' 2\" (1.88 m)   Wt 192 lb (87.1 kg)   SpO2 100%   BMI 24.65 kg/m²     On exam, the patient appears in no acute distress. Speech is clear. Breathing is unlabored. Moves all extremities    Comment: Please note this report has been produced using speech recognition software and may contain errors related to that system including errors in grammar, punctuation, and spelling, as well as words and phrases that may be inappropriate. If there are any questions or concerns please feel free to contact the dictating provider for clarification.        Noreen Dickson, DO  10/18/20 800 Kerwin Burnham, DO  10/18/20 0807

## 2020-11-09 ENCOUNTER — OFFICE VISIT (OUTPATIENT)
Dept: CARDIOLOGY CLINIC | Age: 85
End: 2020-11-09
Payer: COMMERCIAL

## 2020-11-09 VITALS
SYSTOLIC BLOOD PRESSURE: 102 MMHG | BODY MASS INDEX: 24.79 KG/M2 | TEMPERATURE: 97.2 F | HEIGHT: 74 IN | HEART RATE: 76 BPM | DIASTOLIC BLOOD PRESSURE: 52 MMHG | WEIGHT: 193.2 LBS | OXYGEN SATURATION: 95 %

## 2020-11-09 PROCEDURE — 99214 OFFICE O/P EST MOD 30 MIN: CPT | Performed by: NURSE PRACTITIONER

## 2020-11-09 NOTE — PROGRESS NOTES
Stephan Rivera  [facility] [address]  Phone: [phone]    Fax [phone]                  Moises Garcia MD, Marcos Trejo MD, Walt Habermann, MD, Sim Russel, MD Floyce Sandifer, MD Elner Genera, MD Lionell Pipes, APRKIANNA Moore, APRKIANNA Rascon, APRKIANNA Richardson, APRN    CARDIOLOGY  NOTE      11/9/2020    RE: Catalina Walters  (2/2/1921)                               TO:  Dr. Silvestre Phan MD  The primary cardiologist is Dr. Ludin Whitfield    CC:   1. Chronic diastolic congestive heart failure ([facility] [address]    2. Essential hypertension, benign    3. Type 2 diabetes mellitus without complication, without long-term current use of insulin ([facility] [address]        Patient denies all of the following:  Chest Pain  Palpitations  Shortness of Breath  Edema  Dizziness  Syncope      HPI: Thank you for involving me in taking care of your patient Catalina Walters, who is a  80y.o. year old male with a history as listed above. Patient presents to the office for follow up on HTN, and hyperlipidemia. Patient is  an active male who does walk regularly. Patient is  compliant with he medications. Patient denies any cardiac complaints or needs. Vitals:    11/09/20 1527   BP: (!) 102/52   Pulse:    Temp:    SpO2:        Current Outpatient Medications   Medication Sig Dispense Refill    carvedilol (COREG) 25 MG tablet Take 1 tablet by mouth 2 times daily (with meals) 180 tablet 3    NIFEdipine (PROCARDIA XL) 30 MG extended release tablet Take 1 tablet by mouth daily 90 tablet 3    losartan (COZAAR) 100 MG tablet Take 1 tablet by mouth daily 90 tablet 2    furosemide (LASIX) 20 MG tablet Take 1 tablet by mouth daily       levothyroxine (SYNTHROID) 50 MCG tablet Take 1 tablet by mouth Daily       Cholecalciferol (VITAMIN D3) 2000 UNITS CAPS Take 1,000 Units by mouth daily.       simvastatin (ZOCOR) 20 MG tablet Take 20 mg by mouth daily       tamsulosin (FLOMAX) 0.4 MG capsule Take 0.4 mg by mouth daily. No current facility-administered medications for this visit. Allergies: Patient has no known allergies. Past Medical History:   Diagnosis Date    Diabetes mellitus ([address]     Hypertension      Past Surgical History:   Procedure Laterality Date    CHOLECYSTECTOMY  12/2/14    attempted lap, open    HERNIA REPAIR      PROSTATE SURGERY       No family history on file.   Social History     Tobacco Use    Smoking status: Never Smoker    Smokeless tobacco: Never Used   Substance Use Topics    Alcohol use: No        Review of Systems - History obtained from the patient  General: negative for - fatigue, malaise, night sweats, weight gain  Psychological: negative for - anxiety, depression, sleep disturbances  Ophthalmic: negative for - blurry vision, loss of vision  ENT: negative for - headaches, vertigo, visual changes  Hematological and Lymphatic: negative for - bleeding problems, blood clots, bruising, fatigue or pallor  Endocrine: negative for - malaise/lethargy, palpitations, unexpected weight changes  Respiratory: negative for - cough, orthopnea, shortness of breath or tachypnea  Cardiovascular: negative for - chest pain, dyspnea on exertion, edema or palpitations  Gastrointestinal: no abdominal pain, change in bowel habits, or black or bloody stools  Genito-Urinary: no dysuria, trouble voiding, or hematuria  Musculoskeletal: negative for - gait disturbance, pain, swelling    Neurological: negative for - confusion, dizziness, impaired coordination/balance or numbness/tingling    Objective:      Physical Exam:  BP (!) 102/52 (Site: Left Upper Arm, Position: Sitting, Cuff Size: Medium Adult)   Pulse 76   Temp 97.2 °F (36.2 °C)   Ht 6' 2\" (1.88 m)   Wt 193 lb 3.2 oz (87.6 kg)   SpO2 95%   BMI 24.81 kg/m²   Wt Readings from Last 3 Encounters:   11/09/20 193 lb 3.2 oz (87.6 kg)   10/18/20 192 lb (87.1 kg)   05/04/20 185 lb (83.9 kg) in a week is noted. Last testing: Echo 2020 EF 60%      Essential hypertension, benign  Stable on Coreg 25 mg twice daily, losartan 100 mg daily, Procardia 30 mg daily Lasix 20 mg daily    Type 2 diabetes mellitus without complication, without long-term current use of insulin ([facility] [address]  Managed by PCP. Patient seen, interviewed and examined. Testing was reviewed. Patient is encouraged to exercise even a brisk walk for 30 minutes at least 3 to 4 times a week. Lifestyle and risk factor modificatons discussed. Various goals are discussed and questions answered. Continue current medications. Appropriate prescriptions are addressed. Questions answered and patient verbalizes understanding. Call for any problems, questions, or concerns. Pt is to follow up in 6 months for Cardiac management    Electronically signed by Uriah De La Cruz.  SAEID Pizarro CNP on 11/9/2020 at 3:47 PM

## 2020-11-09 NOTE — PROGRESS NOTES
Advised patient to call us and let us know what medications he is taking. Looks like urologist prescribed Bystolic 10 and Kassandra 01/44. Patient and daughter were unaware of current medications during visit.

## 2021-03-20 ENCOUNTER — HOSPITAL ENCOUNTER (INPATIENT)
Age: 86
LOS: 9 days | Discharge: NURSING, REHAB OR LONG-TERM CARE | DRG: 177 | End: 2021-03-29
Attending: EMERGENCY MEDICINE | Admitting: INTERNAL MEDICINE
Payer: COMMERCIAL

## 2021-03-20 ENCOUNTER — APPOINTMENT (OUTPATIENT)
Dept: CT IMAGING | Age: 86
DRG: 177 | End: 2021-03-20
Payer: COMMERCIAL

## 2021-03-20 ENCOUNTER — APPOINTMENT (OUTPATIENT)
Dept: GENERAL RADIOLOGY | Age: 86
DRG: 177 | End: 2021-03-20
Payer: COMMERCIAL

## 2021-03-20 DIAGNOSIS — U07.1 PNEUMONIA DUE TO COVID-19 VIRUS: Primary | ICD-10-CM

## 2021-03-20 DIAGNOSIS — J12.82 PNEUMONIA DUE TO COVID-19 VIRUS: Primary | ICD-10-CM

## 2021-03-20 LAB
ABO/RH: NORMAL
ALBUMIN SERPL-MCNC: 2.9 GM/DL (ref 3.4–5)
ALP BLD-CCNC: 75 IU/L (ref 40–129)
ALT SERPL-CCNC: 47 U/L (ref 10–40)
ANION GAP SERPL CALCULATED.3IONS-SCNC: 11 MMOL/L (ref 4–16)
ANTIBODY SCREEN: NEGATIVE
AST SERPL-CCNC: 89 IU/L (ref 15–37)
BASOPHILS ABSOLUTE: 0 K/CU MM
BASOPHILS RELATIVE PERCENT: 0 % (ref 0–1)
BILIRUB SERPL-MCNC: 0.7 MG/DL (ref 0–1)
BUN BLDV-MCNC: 35 MG/DL (ref 6–23)
CALCIUM SERPL-MCNC: 8.2 MG/DL (ref 8.3–10.6)
CHLORIDE BLD-SCNC: 97 MMOL/L (ref 99–110)
CO2: 23 MMOL/L (ref 21–32)
CREAT SERPL-MCNC: 1.7 MG/DL (ref 0.9–1.3)
DIFFERENTIAL TYPE: ABNORMAL
EOSINOPHILS ABSOLUTE: 0 K/CU MM
EOSINOPHILS RELATIVE PERCENT: 0.2 % (ref 0–3)
GFR AFRICAN AMERICAN: 45 ML/MIN/1.73M2
GFR NON-AFRICAN AMERICAN: 37 ML/MIN/1.73M2
GLUCOSE BLD-MCNC: 228 MG/DL (ref 70–99)
HCT VFR BLD CALC: 35 % (ref 42–52)
HEMOGLOBIN: 11.9 GM/DL (ref 13.5–18)
IMMATURE NEUTROPHIL %: 1 % (ref 0–0.43)
LIPASE: 24 IU/L (ref 13–60)
LYMPHOCYTES ABSOLUTE: 0.4 K/CU MM
LYMPHOCYTES RELATIVE PERCENT: 7.8 % (ref 24–44)
MAGNESIUM: 1.9 MG/DL (ref 1.8–2.4)
MCH RBC QN AUTO: 30.8 PG (ref 27–31)
MCHC RBC AUTO-ENTMCNC: 34 % (ref 32–36)
MCV RBC AUTO: 90.7 FL (ref 78–100)
MONOCYTES ABSOLUTE: 0.2 K/CU MM
MONOCYTES RELATIVE PERCENT: 4.2 % (ref 0–4)
NUCLEATED RBC %: 0 %
PDW BLD-RTO: 13.8 % (ref 11.7–14.9)
PLATELET # BLD: 147 K/CU MM (ref 140–440)
PMV BLD AUTO: 10.8 FL (ref 7.5–11.1)
POTASSIUM SERPL-SCNC: 3.8 MMOL/L (ref 3.5–5.1)
PRO-BNP: 4202 PG/ML
RBC # BLD: 3.86 M/CU MM (ref 4.6–6.2)
SARS-COV-2, NAAT: DETECTED
SEGMENTED NEUTROPHILS ABSOLUTE COUNT: 4.6 K/CU MM
SEGMENTED NEUTROPHILS RELATIVE PERCENT: 86.8 % (ref 36–66)
SODIUM BLD-SCNC: 131 MMOL/L (ref 135–145)
SOURCE: NORMAL
TOTAL CK: 348 IU/L (ref 38–174)
TOTAL IMMATURE NEUTOROPHIL: 0.05 K/CU MM
TOTAL NUCLEATED RBC: 0 K/CU MM
TOTAL PROTEIN: 5.6 GM/DL (ref 6.4–8.2)
TROPONIN T: 0.04 NG/ML
WBC # BLD: 5.3 K/CU MM (ref 4–10.5)

## 2021-03-20 PROCEDURE — 6360000004 HC RX CONTRAST MEDICATION: Performed by: EMERGENCY MEDICINE

## 2021-03-20 PROCEDURE — 99285 EMERGENCY DEPT VISIT HI MDM: CPT

## 2021-03-20 PROCEDURE — 80053 COMPREHEN METABOLIC PANEL: CPT

## 2021-03-20 PROCEDURE — 84484 ASSAY OF TROPONIN QUANT: CPT

## 2021-03-20 PROCEDURE — 83735 ASSAY OF MAGNESIUM: CPT

## 2021-03-20 PROCEDURE — 87635 SARS-COV-2 COVID-19 AMP PRB: CPT

## 2021-03-20 PROCEDURE — 86850 RBC ANTIBODY SCREEN: CPT

## 2021-03-20 PROCEDURE — 74177 CT ABD & PELVIS W/CONTRAST: CPT

## 2021-03-20 PROCEDURE — 87040 BLOOD CULTURE FOR BACTERIA: CPT

## 2021-03-20 PROCEDURE — 83880 ASSAY OF NATRIURETIC PEPTIDE: CPT

## 2021-03-20 PROCEDURE — 2060000000 HC ICU INTERMEDIATE R&B

## 2021-03-20 PROCEDURE — 85025 COMPLETE CBC W/AUTO DIFF WBC: CPT

## 2021-03-20 PROCEDURE — 86901 BLOOD TYPING SEROLOGIC RH(D): CPT

## 2021-03-20 PROCEDURE — 71275 CT ANGIOGRAPHY CHEST: CPT

## 2021-03-20 PROCEDURE — 81001 URINALYSIS AUTO W/SCOPE: CPT

## 2021-03-20 PROCEDURE — 86900 BLOOD TYPING SEROLOGIC ABO: CPT

## 2021-03-20 PROCEDURE — 6360000002 HC RX W HCPCS: Performed by: EMERGENCY MEDICINE

## 2021-03-20 PROCEDURE — 36415 COLL VENOUS BLD VENIPUNCTURE: CPT

## 2021-03-20 PROCEDURE — 96374 THER/PROPH/DIAG INJ IV PUSH: CPT

## 2021-03-20 PROCEDURE — 70450 CT HEAD/BRAIN W/O DYE: CPT

## 2021-03-20 PROCEDURE — 83690 ASSAY OF LIPASE: CPT

## 2021-03-20 PROCEDURE — 82550 ASSAY OF CK (CPK): CPT

## 2021-03-20 PROCEDURE — 71045 X-RAY EXAM CHEST 1 VIEW: CPT

## 2021-03-20 RX ORDER — LATANOPROST 50 UG/ML
1 SOLUTION/ DROPS OPHTHALMIC NIGHTLY
Status: DISCONTINUED | OUTPATIENT
Start: 2021-03-20 | End: 2021-03-29 | Stop reason: HOSPADM

## 2021-03-20 RX ORDER — ACETAMINOPHEN 325 MG/1
650 TABLET ORAL EVERY 6 HOURS PRN
Status: DISCONTINUED | OUTPATIENT
Start: 2021-03-20 | End: 2021-03-29 | Stop reason: HOSPADM

## 2021-03-20 RX ORDER — ATORVASTATIN CALCIUM 10 MG/1
10 TABLET, FILM COATED ORAL DAILY
Status: DISCONTINUED | OUTPATIENT
Start: 2021-03-21 | End: 2021-03-29 | Stop reason: HOSPADM

## 2021-03-20 RX ORDER — ACETAMINOPHEN 650 MG/1
650 SUPPOSITORY RECTAL EVERY 6 HOURS PRN
Status: DISCONTINUED | OUTPATIENT
Start: 2021-03-20 | End: 2021-03-29 | Stop reason: HOSPADM

## 2021-03-20 RX ORDER — ONDANSETRON 2 MG/ML
4 INJECTION INTRAMUSCULAR; INTRAVENOUS EVERY 6 HOURS PRN
Status: DISCONTINUED | OUTPATIENT
Start: 2021-03-20 | End: 2021-03-29 | Stop reason: HOSPADM

## 2021-03-20 RX ORDER — CARVEDILOL 25 MG/1
25 TABLET ORAL 2 TIMES DAILY WITH MEALS
Status: DISCONTINUED | OUTPATIENT
Start: 2021-03-21 | End: 2021-03-29 | Stop reason: HOSPADM

## 2021-03-20 RX ORDER — FUROSEMIDE 10 MG/ML
40 INJECTION INTRAMUSCULAR; INTRAVENOUS ONCE
Status: DISCONTINUED | OUTPATIENT
Start: 2021-03-20 | End: 2021-03-20

## 2021-03-20 RX ORDER — TAMSULOSIN HYDROCHLORIDE 0.4 MG/1
0.4 CAPSULE ORAL DAILY
Status: DISCONTINUED | OUTPATIENT
Start: 2021-03-21 | End: 2021-03-29 | Stop reason: HOSPADM

## 2021-03-20 RX ORDER — DEXAMETHASONE 4 MG/1
6 TABLET ORAL DAILY
Status: DISCONTINUED | OUTPATIENT
Start: 2021-03-20 | End: 2021-03-29 | Stop reason: HOSPADM

## 2021-03-20 RX ORDER — PROMETHAZINE HYDROCHLORIDE 25 MG/1
12.5 TABLET ORAL EVERY 6 HOURS PRN
Status: DISCONTINUED | OUTPATIENT
Start: 2021-03-20 | End: 2021-03-29 | Stop reason: HOSPADM

## 2021-03-20 RX ORDER — VITAMIN B COMPLEX
2000 TABLET ORAL DAILY
Status: DISCONTINUED | OUTPATIENT
Start: 2021-03-21 | End: 2021-03-21

## 2021-03-20 RX ORDER — SODIUM CHLORIDE 9 MG/ML
INJECTION, SOLUTION INTRAVENOUS PRN
Status: DISCONTINUED | OUTPATIENT
Start: 2021-03-20 | End: 2021-03-29 | Stop reason: HOSPADM

## 2021-03-20 RX ORDER — DEXAMETHASONE SODIUM PHOSPHATE 10 MG/ML
10 INJECTION, SOLUTION INTRAMUSCULAR; INTRAVENOUS ONCE
Status: COMPLETED | OUTPATIENT
Start: 2021-03-20 | End: 2021-03-20

## 2021-03-20 RX ORDER — LEVOTHYROXINE SODIUM 0.05 MG/1
50 TABLET ORAL DAILY
Status: DISCONTINUED | OUTPATIENT
Start: 2021-03-21 | End: 2021-03-29 | Stop reason: HOSPADM

## 2021-03-20 RX ORDER — FUROSEMIDE 20 MG/1
20 TABLET ORAL DAILY
Status: DISCONTINUED | OUTPATIENT
Start: 2021-03-21 | End: 2021-03-29 | Stop reason: HOSPADM

## 2021-03-20 RX ORDER — POLYETHYLENE GLYCOL 3350 17 G/17G
17 POWDER, FOR SOLUTION ORAL DAILY PRN
Status: DISCONTINUED | OUTPATIENT
Start: 2021-03-20 | End: 2021-03-29 | Stop reason: HOSPADM

## 2021-03-20 RX ORDER — SODIUM CHLORIDE 0.9 % (FLUSH) 0.9 %
10 SYRINGE (ML) INJECTION PRN
Status: DISCONTINUED | OUTPATIENT
Start: 2021-03-20 | End: 2021-03-29 | Stop reason: HOSPADM

## 2021-03-20 RX ORDER — SODIUM CHLORIDE 0.9 % (FLUSH) 0.9 %
10 SYRINGE (ML) INJECTION EVERY 12 HOURS SCHEDULED
Status: DISCONTINUED | OUTPATIENT
Start: 2021-03-20 | End: 2021-03-29 | Stop reason: HOSPADM

## 2021-03-20 RX ORDER — LOSARTAN POTASSIUM 50 MG/1
100 TABLET ORAL DAILY
Status: DISCONTINUED | OUTPATIENT
Start: 2021-03-21 | End: 2021-03-29 | Stop reason: HOSPADM

## 2021-03-20 RX ADMIN — DEXAMETHASONE SODIUM PHOSPHATE 10 MG: 10 INJECTION INTRAMUSCULAR; INTRAVENOUS at 20:03

## 2021-03-20 RX ADMIN — IOPAMIDOL 80 ML: 755 INJECTION, SOLUTION INTRAVENOUS at 19:07

## 2021-03-20 ASSESSMENT — PAIN SCALES - GENERAL: PAINLEVEL_OUTOF10: 0

## 2021-03-20 NOTE — ED NOTES
Nam egan (extra green), lactic from patient's left median AC.       Christophe Ingram  03/20/21 3534

## 2021-03-20 NOTE — ED NOTES
Nam 1st blood culture set, red top tube from patient's right wrist.      Augustus Patino  03/20/21 3195

## 2021-03-20 NOTE — ED PROVIDER NOTES
tablet by mouth daily 90 tablet 2    furosemide (LASIX) 20 MG tablet Take 1 tablet by mouth daily       levothyroxine (SYNTHROID) 50 MCG tablet Take 1 tablet by mouth Daily       Cholecalciferol (VITAMIN D3) 2000 UNITS CAPS Take 1,000 Units by mouth daily.  simvastatin (ZOCOR) 20 MG tablet Take 20 mg by mouth daily       tamsulosin (FLOMAX) 0.4 MG capsule Take 0.4 mg by mouth daily. ALLERGIES    No Known Allergies    SOCIAL AND FAMILY HISTORY    Social History     Socioeconomic History    Marital status:      Spouse name: None    Number of children: None    Years of education: None    Highest education level: None   Occupational History    None   Social Needs    Financial resource strain: None    Food insecurity     Worry: None     Inability: None    Transportation needs     Medical: None     Non-medical: None   Tobacco Use    Smoking status: Never Smoker    Smokeless tobacco: Never Used   Substance and Sexual Activity    Alcohol use: No    Drug use: No    Sexual activity: Not Currently   Lifestyle    Physical activity     Days per week: None     Minutes per session: None    Stress: None   Relationships    Social connections     Talks on phone: None     Gets together: None     Attends Faith service: None     Active member of club or organization: None     Attends meetings of clubs or organizations: None     Relationship status: None    Intimate partner violence     Fear of current or ex partner: None     Emotionally abused: None     Physically abused: None     Forced sexual activity: None   Other Topics Concern    None   Social History Narrative    None     History reviewed. No pertinent family history. PHYSICAL EXAM    VITAL SIGNS: /61   Pulse 57   Resp 24   Ht 6' 2\" (1.88 m)   Wt 192 lb (87.1 kg)   SpO2 90%   BMI 24.65 kg/m²    Constitutional:  Well developed, No acute distress. HENT:  Normocephalic, Atraumatic, Sclera clear. Conjunctiva normal. Right eye blindness. Neck: supple without ridigity  Cardiovascular:  Regular rate and rhythm, No murmurs  Respiratory:  Nonlabored breathing. Coarse breath sounds bilaterally. Abdomen: Soft, nondistended, right sided abdominal tenderness, bowel sounds hyperactive. Musculoskeletal: No edema, No tenderness  Integument:  Warm, Dry, no rash  Neurologic:  Alert & oriented. Vision is intact. Right eye blindness. Left eye pupil reactive, face is symmetrical, tongue is midline. Motor function and sensation are grossly intact. There is no pronator drift. No trunchal ataxia. Speech is normal.   Psychiatric:  Affect normal, Mood normal.       Labs:  Labs Reviewed   CBC WITH AUTO DIFFERENTIAL - Abnormal; Notable for the following components:       Result Value    RBC 3.86 (*)     Hemoglobin 11.9 (*)     Hematocrit 35.0 (*)     Segs Relative 86.8 (*)     Lymphocytes % 7.8 (*)     Monocytes % 4.2 (*)     Immature Neutrophil % 1.0 (*)     All other components within normal limits   COMPREHENSIVE METABOLIC PANEL - Abnormal; Notable for the following components:    Sodium 131 (*)     Chloride 97 (*)     BUN 35 (*)     CREATININE 1.7 (*)     Glucose 228 (*)     Calcium 8.2 (*)     Albumin 2.9 (*)     Total Protein 5.6 (*)     ALT 47 (*)     AST 89 (*)     GFR Non- 37 (*)     GFR  45 (*)     All other components within normal limits   TROPONIN - Abnormal; Notable for the following components:    Troponin T 0.037 (*)     All other components within normal limits   BRAIN NATRIURETIC PEPTIDE - Abnormal; Notable for the following components:    Pro-BNP 4,202 (*)     All other components within normal limits   CK - Abnormal; Notable for the following components:     Total  (*)     All other components within normal limits   COVID-19, RAPID   CULTURE, BLOOD 1   CULTURE, BLOOD 2   LEGIONELLA ANTIGEN, URINE   STREP PNEUMONIAE ANTIGEN   CULTURE, RESPIRATORY   LIPASE   MAGNESIUM   URINALYSIS   CBC WITH AUTO DIFFERENTIAL   COMPREHENSIVE METABOLIC PANEL W/ REFLEX TO MG FOR LOW K   PROTIME-INR   APTT   FIBRINOGEN   PROCALCITONIN   C-REACTIVE PROTEIN   LACTATE DEHYDROGENASE   FERRITIN   D-DIMER, QUANTITATIVE   TROPONIN   LACTIC ACID, PLASMA   VITAMIN D 25 HYDROXY   TYPE AND SCREEN   PREPARE COVID-19 CONVALESCENT PLASMA     RADIOLOGY    ,Ct Head Wo Contrast    Result Date: 3/20/2021  EXAMINATION: CT OF THE HEAD WITHOUT CONTRAST  3/20/2021 7:03 pm TECHNIQUE: CT of the head was performed without the administration of intravenous contrast. Dose modulation, iterative reconstruction, and/or weight based adjustment of the mA/kV was utilized to reduce the radiation dose to as low as reasonably achievable. COMPARISON: 03/09/2020 HISTORY: ORDERING SYSTEM PROVIDED HISTORY: weakness TECHNOLOGIST PROVIDED HISTORY: Has a \"code stroke\" or \"stroke alert\" been called? ->No Reason for exam:->weakness Decision Support Exception->Emergency Medical Condition (MA) Reason for Exam: weakness Acuity: Acute Type of Exam: Initial FINDINGS: BRAIN/VENTRICLES: There is no acute intracranial hemorrhage, mass effect or midline shift. No abnormal extra-axial fluid collection. The gray-white differentiation is maintained without evidence of an acute infarct. There is no evidence of hydrocephalus. Generalized involutional change. Moderate severe periventricular subcortical white matter hypoattenuation suggestive chronic small vessel ischemic disease. Stable prominence of the extra-axial spaces greatest on left similar prior exam.  Vascular calcifications. ORBITS: Right-sided pfithisis bulbous SINUSES: Chronic sinus disease. Moderate ethmoid sinus mucosal thickening. Mastoids are clear. SOFT TISSUES/SKULL:  No acute abnormality of the visualized skull or soft tissues. No acute intracranial abnormality.      Ct Abdomen Pelvis W Iv Contrast Additional Contrast? None    Result Date: 3/20/2021  EXAMINATION: CT OF THE ABDOMEN AND PELVIS WITH CONTRAST 3/20/2021 7:04 pm TECHNIQUE: CT of the abdomen and pelvis was performed with the administration of intravenous contrast. Multiplanar reformatted images are provided for review. Dose modulation, iterative reconstruction, and/or weight based adjustment of the mA/kV was utilized to reduce the radiation dose to as low as reasonably achievable. COMPARISON: MRI 11/30/2014 HISTORY: ORDERING SYSTEM PROVIDED HISTORY: abdominal pain, diarrhea TECHNOLOGIST PROVIDED HISTORY: Reason for exam:->abdominal pain, diarrhea Additional Contrast?->None Decision Support Exception->Emergency Medical Condition (MA) Reason for Exam: abd pain,diarrhea Acuity: Acute Type of Exam: Unknown FINDINGS: Lower Chest: Multifocal airspace opacities. Cardiomegaly. Thickening esophageal mural thickening and surrounding haziness noted. Adjacent nodules identified this could be related to reflux esophagitis or underlying neoplastic process. Organs: Right renal cyst.  Kidneys, spleen, adrenal glands, pancreas otherwise unremarkable. Previous cholecystectomy. Otherwise liver unremarkable. GI/Bowel: Colonic diverticulosis. No bowel obstruction. Appendix not identified. No Pelvis: Enlarged prostate. Bladder is not well distended. Peritoneum/Retroperitoneum: No free air or free fluid. Aortic vascular calcifications. Bones/Soft Tissues: Degenerate changes lower spine. Esophageal wall mural thickening could be related to underdistention, esophagitis, or reflux. Neoplasm may also be considered in differential although thought less likely. This associated with adjacent nodules. If warranted, esophagram or upper GI may be considered if this may change patient management. Colonic diverticulosis. Enlarged prostate. Xr Chest Portable    Result Date: 3/20/2021  EXAMINATION: ONE XRAY VIEW OF THE CHEST 3/20/2021 4:34 pm COMPARISON: Chest radiograph performed October 18, 2020.  HISTORY: ORDERING SYSTEM PROVIDED HISTORY: weakness, fatigue TECHNOLOGIST PROVIDED HISTORY: Reason for exam:->weakness, fatigue Reason for Exam: weakness, fatigue Acuity: Acute Type of Exam: Initial FINDINGS: Patchy bilateral airspace opacities. No definite pleural effusion or pneumothorax. Stable cardiomediastinal silhouette. No acute osseous abnormality. Patchy bilateral airspace opacities which can be seen with multifocal pneumonia or pulmonary edema. Cta Pulmonary W Contrast    Result Date: 3/20/2021  EXAMINATION: CTA OF THE CHEST 3/20/2021 7:04 pm TECHNIQUE: CTA of the chest was performed after the administration of intravenous contrast.  Multiplanar reformatted images are provided for review. MIP images are provided for review. Dose modulation, iterative reconstruction, and/or weight based adjustment of the mA/kV was utilized to reduce the radiation dose to as low as reasonably achievable. COMPARISON: Chest x-ray 10/18/2020 HISTORY: ORDERING SYSTEM PROVIDED HISTORY: new onset CHF TECHNOLOGIST PROVIDED HISTORY: Reason for exam:->new onset CHF Decision Support Exception->Emergency Medical Condition (MA) Reason for Exam: new onset of CHF Acuity: Unknown Type of Exam: Unknown FINDINGS: Pulmonary Arteries: Main pulmonary artery is enlarged 3.8 cm suggesting pulmonary arterial hypertension. Probable calcified granuloma involving 1 of the right lower lobe vessels is unclear is within the lumen versus adjacent to 1 of the right lower lobe vessels, however there is satisfactory opacification distal to this calcific density, this is doubtful clinical significance. . Mediastinum: Cardiomegaly. Pericardial effusion. Coronary calcifications. Aortic vascular calcifications. Esophageal wall mural thickening few adjacent subcentimeter lymph nodes. This could be related to esophagitis. Lungs/pleura: Multifocal interstitial alveolar opacities both lungs may represent multifocal pulmonary edema multifocal atypical pneumonia or typical pneumonia.  Upper Abdomen: CT abdomen pelvis Soft Tissues/Bones: Multilevel abdomen degenerate changes. Multilevel ankylosis. No central to segmental pulmonary embolism. Multifocal airspace disease. Findings may be due to interstitial alveolar edema or typical or atypical pneumonia. Cardiomegaly coronary artery disease. Pericardial effusion. Distal esophageal wall mural thickening please see CT of abdomen/pelvis differential.         ED COURSE & MEDICAL DECISION MAKING       Vital signs and nursing notes reviewed during ED course. All pertinent Lab data and radiographic results reviewed with patient at bedside. The patient and/or the family were informed of the results of any tests/labs/imaging, the treatment plan, and time was allotted to answer questions. 79yo male presenting with fatigue. Work-up was initiated. Found to have patchy bilateral airspace opacities. May be pneumonia versus pulmonary edema. BNP was slightly elevated, troponin was detectable. Covid was positive, thus CTA performed to rule out PE given the change in cardiac markers. Showed no evidence of pulmonary embolism. He was initially 90% on room air, did drop down to the high 80s, thus placed on 2 L by nasal cannula which she did not tolerate well. Saturating 96% on this 2 L. Given the Covid positive, pneumonia with hypoxia will plan for admission to the hospital for further evaluation and care. Spoke with hospitalist who agrees to med at this time. Due to the immediate potential for life-threatening deterioration due to hypoxia, I spent 35 minutes providing critical care. This time is excluding time spent performing procedures.       Clinical  IMPRESSION    Covid pneumonia, acute respiratory failure with hypoxia          (Please note the MDM and HPI sections of this note were completed with a voice recognition program.  Efforts were made to edit the dictations but occasionally words are mis-transcribed.)     Medardo Resendez DO  03/20/21 3337

## 2021-03-21 LAB
ALBUMIN SERPL-MCNC: 3.4 GM/DL (ref 3.4–5)
ALP BLD-CCNC: 92 IU/L (ref 40–128)
ALT SERPL-CCNC: 51 U/L (ref 10–40)
ANION GAP SERPL CALCULATED.3IONS-SCNC: 14 MMOL/L (ref 4–16)
APTT: 41.8 SECONDS (ref 25.1–37.1)
AST SERPL-CCNC: 89 IU/L (ref 15–37)
BASOPHILS ABSOLUTE: 0 K/CU MM
BASOPHILS RELATIVE PERCENT: 0.2 % (ref 0–1)
BILIRUB SERPL-MCNC: 0.7 MG/DL (ref 0–1)
BUN BLDV-MCNC: 34 MG/DL (ref 6–23)
CALCIUM SERPL-MCNC: 8.4 MG/DL (ref 8.3–10.6)
CHLORIDE BLD-SCNC: 98 MMOL/L (ref 99–110)
CO2: 21 MMOL/L (ref 21–32)
CREAT SERPL-MCNC: 1.6 MG/DL (ref 0.9–1.3)
D DIMER: 680 NG/ML(DDU)
DIFFERENTIAL TYPE: ABNORMAL
EOSINOPHILS ABSOLUTE: 0 K/CU MM
EOSINOPHILS RELATIVE PERCENT: 0 % (ref 0–3)
FERRITIN: 1432 NG/ML (ref 30–400)
FIBRINOGEN LEVEL: 448 MG/DL (ref 196.9–442.1)
GFR AFRICAN AMERICAN: 48 ML/MIN/1.73M2
GFR NON-AFRICAN AMERICAN: 40 ML/MIN/1.73M2
GLUCOSE BLD-MCNC: 239 MG/DL (ref 70–99)
HCT VFR BLD CALC: 41.6 % (ref 42–52)
HEMOGLOBIN: 13.8 GM/DL (ref 13.5–18)
HIGH SENSITIVE C-REACTIVE PROTEIN: 139.5 MG/L
IMMATURE NEUTROPHIL %: 0.8 % (ref 0–0.43)
INR BLD: 1.03 INDEX
LACTATE DEHYDROGENASE: 353 IU/L (ref 120–246)
LACTATE: 1.1 MMOL/L (ref 0.4–2)
LYMPHOCYTES ABSOLUTE: 0.4 K/CU MM
LYMPHOCYTES RELATIVE PERCENT: 7.2 % (ref 24–44)
MCH RBC QN AUTO: 30.2 PG (ref 27–31)
MCHC RBC AUTO-ENTMCNC: 33.2 % (ref 32–36)
MCV RBC AUTO: 91 FL (ref 78–100)
MONOCYTES ABSOLUTE: 0.1 K/CU MM
MONOCYTES RELATIVE PERCENT: 2 % (ref 0–4)
NUCLEATED RBC %: 0 %
PDW BLD-RTO: 13.7 % (ref 11.7–14.9)
PLATELET # BLD: 172 K/CU MM (ref 140–440)
PMV BLD AUTO: 10.8 FL (ref 7.5–11.1)
POTASSIUM SERPL-SCNC: 3.9 MMOL/L (ref 3.5–5.1)
PROCALCITONIN: 1.46
PROTHROMBIN TIME: 12.5 SECONDS (ref 11.7–14.5)
RBC # BLD: 4.57 M/CU MM (ref 4.6–6.2)
SEGMENTED NEUTROPHILS ABSOLUTE COUNT: 4.6 K/CU MM
SEGMENTED NEUTROPHILS RELATIVE PERCENT: 89.8 % (ref 36–66)
SODIUM BLD-SCNC: 133 MMOL/L (ref 135–145)
TOTAL IMMATURE NEUTOROPHIL: 0.04 K/CU MM
TOTAL NUCLEATED RBC: 0 K/CU MM
TOTAL PROTEIN: 6 GM/DL (ref 6.4–8.2)
TROPONIN T: 0.03 NG/ML
VITAMIN D 25-HYDROXY: 53.22 NG/ML
WBC # BLD: 5.1 K/CU MM (ref 4–10.5)

## 2021-03-21 PROCEDURE — 84484 ASSAY OF TROPONIN QUANT: CPT

## 2021-03-21 PROCEDURE — 85025 COMPLETE CBC W/AUTO DIFF WBC: CPT

## 2021-03-21 PROCEDURE — 83615 LACTATE (LD) (LDH) ENZYME: CPT

## 2021-03-21 PROCEDURE — 86141 C-REACTIVE PROTEIN HS: CPT

## 2021-03-21 PROCEDURE — 99222 1ST HOSP IP/OBS MODERATE 55: CPT | Performed by: INTERNAL MEDICINE

## 2021-03-21 PROCEDURE — 85379 FIBRIN DEGRADATION QUANT: CPT

## 2021-03-21 PROCEDURE — 2580000003 HC RX 258: Performed by: INTERNAL MEDICINE

## 2021-03-21 PROCEDURE — 83605 ASSAY OF LACTIC ACID: CPT

## 2021-03-21 PROCEDURE — 82306 VITAMIN D 25 HYDROXY: CPT

## 2021-03-21 PROCEDURE — 84145 PROCALCITONIN (PCT): CPT

## 2021-03-21 PROCEDURE — 82728 ASSAY OF FERRITIN: CPT

## 2021-03-21 PROCEDURE — 80053 COMPREHEN METABOLIC PANEL: CPT

## 2021-03-21 PROCEDURE — 36430 TRANSFUSION BLD/BLD COMPNT: CPT

## 2021-03-21 PROCEDURE — 85730 THROMBOPLASTIN TIME PARTIAL: CPT

## 2021-03-21 PROCEDURE — 85610 PROTHROMBIN TIME: CPT

## 2021-03-21 PROCEDURE — 6360000002 HC RX W HCPCS: Performed by: INTERNAL MEDICINE

## 2021-03-21 PROCEDURE — 85384 FIBRINOGEN ACTIVITY: CPT

## 2021-03-21 PROCEDURE — 94761 N-INVAS EAR/PLS OXIMETRY MLT: CPT

## 2021-03-21 PROCEDURE — 6370000000 HC RX 637 (ALT 250 FOR IP): Performed by: INTERNAL MEDICINE

## 2021-03-21 PROCEDURE — 2700000000 HC OXYGEN THERAPY PER DAY

## 2021-03-21 PROCEDURE — 2060000000 HC ICU INTERMEDIATE R&B

## 2021-03-21 RX ORDER — PANTOPRAZOLE SODIUM 40 MG/1
40 TABLET, DELAYED RELEASE ORAL
Status: DISCONTINUED | OUTPATIENT
Start: 2021-03-21 | End: 2021-03-29 | Stop reason: HOSPADM

## 2021-03-21 RX ADMIN — LEVOTHYROXINE SODIUM 50 MCG: 50 TABLET ORAL at 09:07

## 2021-03-21 RX ADMIN — SODIUM CHLORIDE, PRESERVATIVE FREE 10 ML: 5 INJECTION INTRAVENOUS at 21:20

## 2021-03-21 RX ADMIN — TAMSULOSIN HYDROCHLORIDE 0.4 MG: 0.4 CAPSULE ORAL at 09:05

## 2021-03-21 RX ADMIN — SODIUM CHLORIDE, PRESERVATIVE FREE 10 ML: 5 INJECTION INTRAVENOUS at 09:07

## 2021-03-21 RX ADMIN — ENOXAPARIN SODIUM 30 MG: 30 INJECTION SUBCUTANEOUS at 09:05

## 2021-03-21 RX ADMIN — DEXAMETHASONE 6 MG: 4 TABLET ORAL at 09:05

## 2021-03-21 RX ADMIN — Medication 2000 UNITS: at 13:40

## 2021-03-21 RX ADMIN — ATORVASTATIN CALCIUM 10 MG: 10 TABLET, FILM COATED ORAL at 09:05

## 2021-03-21 RX ADMIN — PANTOPRAZOLE SODIUM 40 MG: 40 TABLET, DELAYED RELEASE ORAL at 13:40

## 2021-03-21 RX ADMIN — ENOXAPARIN SODIUM 30 MG: 30 INJECTION SUBCUTANEOUS at 21:20

## 2021-03-21 ASSESSMENT — PAIN SCALES - GENERAL
PAINLEVEL_OUTOF10: 0

## 2021-03-21 NOTE — ED NOTES
Report given to [facility] and care transferred at this time     Artem Reyes RN  03/20/21 2043

## 2021-03-21 NOTE — H&P
HISTORY AND PHYSICAL  (Hospitalist, Internal Medicine)  IDENTIFYING INFORMATION   PATIENT:  Bell Garduno  MRN:  [id number]  ADMIT DATE: 3/20/2021  TIME OF EVALUATION: 3/20/2021 10:27 PM    CHIEF COMPLAINT     Respiratory  HISTORY OF PRESENT ILLNESS   Bell Garduno is a 80 y.o. male admitted for generalized fatigue that is becoming worse over the last couple days, however just yesterday patient was having difficulty with ambulation, and an overall getting around his home. Patient complained of fatigue. Family brought him to the hospital, however there was no complaints of shortness of breath, cough. Patient is witnessed to be coughing in the ED. There was some report of diarrhea, however nonbloody. During my examination patient is able to speak in full sentences, however is requiring 4 L oxygen, saturating in the high 80s. Patient denies any pain, did complain of right lower extremity weakness has been going on for several days to about almost a week. Patient is aware of the day of week, somewhat to situation, and location, but overall is a limited historian. Ancillary information was obtained by phone call conversation with Amy Wiley his daughter. PMH listed below:    PAST MEDICAL, SURGICAL, FAMILY, and SOCIAL HISTORY     Past Medical History:   Diagnosis Date    Diabetes mellitus ([address]     Hypertension      Past Surgical History:   Procedure Laterality Date    CHOLECYSTECTOMY  12/2/14    attempted lap, open    HERNIA REPAIR      PROSTATE SURGERY       History reviewed. No pertinent family history. Family Hx of HTN  Family Hx as reviewed above, otherwise non-contributory  Social History     Socioeconomic History    Marital status:       Spouse name: None    Number of children: None    Years of education: None    Highest education level: None   Occupational History    None   Social Needs    Financial resource strain: None    Food insecurity     Worry: None     Inability: None    2,000 Units Oral Daily Raheel Perales MD        0.9 % sodium chloride infusion   Intravenous PRN Raheel Perales MD         Current Outpatient Medications   Medication Sig Dispense Refill    carvedilol (COREG) 25 MG tablet Take 1 tablet by mouth 2 times daily (with meals) 180 tablet 3    NIFEdipine (PROCARDIA XL) 30 MG extended release tablet Take 1 tablet by mouth daily 90 tablet 3    losartan (COZAAR) 100 MG tablet Take 1 tablet by mouth daily 90 tablet 2    furosemide (LASIX) 20 MG tablet Take 1 tablet by mouth daily       levothyroxine (SYNTHROID) 50 MCG tablet Take 1 tablet by mouth Daily       Cholecalciferol (VITAMIN D3) 2000 UNITS CAPS Take 1,000 Units by mouth daily.  simvastatin (ZOCOR) 20 MG tablet Take 20 mg by mouth daily       tamsulosin (FLOMAX) 0.4 MG capsule Take 0.4 mg by mouth daily. Allergies  No Known Allergies    REVIEW OF SYSTEMS   Within above limitations. 14 point review of systems reviewed. Pertinent positive or negative as per HPI or otherwise negative per 14 point systems review. PHYSICAL EXAM     Wt Readings from Last 3 Encounters:   03/20/21 192 lb (87.1 kg)   11/09/20 193 lb 3.2 oz (87.6 kg)   10/18/20 192 lb (87.1 kg)       Blood pressure 117/60, pulse 57, temperature 97.8 °F (36.6 °C), temperature source Oral, resp. rate 20, height 6' 2\" (1.88 m), weight 192 lb (87.1 kg), SpO2 96 %. General - AAO x 3  Psych - Appropriate affect/speech. No agitation  Eyes - Eye lids intact. No scleral icterus  ENT - Lips wnl. External ear clear/dry/intact. No thyromegaly on inspection  Neuro - No gross peripheral or central neuro deficits on inspection; no lateralization, 5/5 strength bilateral lower extremities on hip flexion and knee extension, upper extremity neuro intact as well  Heart - Sinus. RRR. S1 and S2 present. No added HS/murmurs appreciated. No elevated JVD appreciated  Lung - Adequate air entry b/l, No crackles/wheezes appreciated  GI - Soft.  No consult cardiology          - trend trop         - cont on PRN O2 via NC         - held Lasix PO BID, until echo, d/t borderline BP and possible sepsis   -Consider BiPAP         - c/w statin, BB         - monitor BNP level         - daily weights, monitor I/O         - Echo    Borderline hypotension w/ bradycardia  -Monitor on telemetry  -Hold beta-blockers    C/o RLE- for days  - no focal deficits, 5/5 BLE at hip flexion  - neurochecks  - MRI  - consider neuro consult    Possibly d/t gastroenteritis, likely viral  - C.  Diff stool study  - GI diarrhea panel  - hold lasix, anti-emetic     Glaucoma    Latanoprost     HTN- hold  Given borderline low BP     BPH  - no difficulty urination  - strict I/O    ROSARIO vs CKD  - avoid nephrotoxic medication  - hold lasix  - monitor I/Os  - consider nephrology consult       Case d/w ED provider    DVT ppx: lovenox  Code status: full    Due to the immediate potential for life-threatening deterioration due to  acute hypoxic respiratory failure secondary to Covid pneumonia, I spent 35 minutes providing critical care    [facility], Internal Medicine  3/20/2021 at 10:27 PM

## 2021-03-21 NOTE — CONSULTS
Subjective:   CHIEF COMPLAINT / HPI:  8 year old male admitted with worsening fatigue,hypoxemia and has some sob. He is currently on 4 litres o2      Past Medical History:  Past Medical History:   Diagnosis Date    Diabetes mellitus ([facility] [address]     Hypertension        Past Surgical History:        Procedure Laterality Date    CHOLECYSTECTOMY  12/2/14    attempted lap, open    HERNIA REPAIR      PROSTATE SURGERY         Current Medications:    Current Facility-Administered Medications: pantoprazole (PROTONIX) tablet 40 mg, 40 mg, Oral, QAM AC  vitamin D CAPS 2,000 Units, 2,000 Units, Oral, Daily  sodium chloride flush 0.9 % injection 10 mL, 10 mL, Intravenous, 2 times per day  sodium chloride flush 0.9 % injection 10 mL, 10 mL, Intravenous, PRN  enoxaparin (LOVENOX) injection 30 mg, 30 mg, Subcutaneous, BID  promethazine (PHENERGAN) tablet 12.5 mg, 12.5 mg, Oral, Q6H PRN **OR** ondansetron (ZOFRAN) injection 4 mg, 4 mg, Intravenous, Q6H PRN  polyethylene glycol (GLYCOLAX) packet 17 g, 17 g, Oral, Daily PRN  acetaminophen (TYLENOL) tablet 650 mg, 650 mg, Oral, Q6H PRN **OR** acetaminophen (TYLENOL) suppository 650 mg, 650 mg, Rectal, Q6H PRN  dexamethasone (DECADRON) tablet 6 mg, 6 mg, Oral, Daily  0.9 % sodium chloride infusion, , Intravenous, PRN  [Held by provider] carvedilol (COREG) tablet 25 mg, 25 mg, Oral, BID WC  [Held by provider] furosemide (LASIX) tablet 20 mg, 20 mg, Oral, Daily  losartan (COZAAR) tablet 100 mg, 100 mg, Oral, Daily  levothyroxine (SYNTHROID) tablet 50 mcg, 50 mcg, Oral, Daily  tamsulosin (FLOMAX) capsule 0.4 mg, 0.4 mg, Oral, Daily  atorvastatin (LIPITOR) tablet 10 mg, 10 mg, Oral, Daily  latanoprost (XALATAN) 0.005 % ophthalmic solution 1 drop, 1 drop, Both Eyes, Nightly    No Known Allergies    Social History:    Social History     Socioeconomic History    Marital status:       Spouse name: None    Number of children: None    Years of education: None    Highest education level: None   Occupational History    None   Social Needs    Financial resource strain: None    Food insecurity     Worry: None     Inability: None    Transportation needs     Medical: None     Non-medical: None   Tobacco Use    Smoking status: Never Smoker    Smokeless tobacco: Never Used   Substance and Sexual Activity    Alcohol use: No    Drug use: No    Sexual activity: Not Currently   Lifestyle    Physical activity     Days per week: None     Minutes per session: None    Stress: None   Relationships    Social connections     Talks on phone: None     Gets together: None     Attends Islam service: None     Active member of club or organization: None     Attends meetings of clubs or organizations: None     Relationship status: None    Intimate partner violence     Fear of current or ex partner: None     Emotionally abused: None     Physically abused: None     Forced sexual activity: None   Other Topics Concern    None   Social History Narrative    None       Family History:   History reviewed. No pertinent family history. Immunization:    There is no immunization history on file for this patient. REVIEW OF SYSTEMS:    CONSTITUTIONAL:  negative for fevers, chills, diaphoresis, activity change, appetite change, fatigue, night sweats and unexpected weight change.    EYES:  negative for blurred vision, eye discharge, visual disturbance and icterus  HEENT:  negative for hearing loss, tinnitus, ear drainage, sinus pressure, nasal congestion, epistaxis and snoring  RESPIRATORY:  See HPI  CARDIOVASCULAR:  negative for chest pain, palpitations, exertional chest pressure/discomfort, edema, syncope  GASTROINTESTINAL:  negative for nausea, vomiting, diarrhea, constipation, blood in stool and abdominal pain  GENITOURINARY:  negative for frequency, dysuria and hematuria  HEMATOLOGIC/LYMPHATIC:  negative for easy bruising, bleeding and lymphadenopathy  ALLERGIC/IMMUNOLOGIC:  negative for recurrent infections, angioedema, anaphylaxis and drug reactions  ENDOCRINE:  negative for weight changes and diabetic symptoms including polyuria, polydipsia and polyphagia    MUSCULOSKELETAL:  negative for  pain, joint swelling, decreased range of motion and muscle weakness  NEUROLOGICAL:  negative for headaches, slurred speech, unilateral weakness  PSYCHIATRIC/BEHAVIORAL: negative for hallucinations, behavioral problems, confusion and agitation. Objective:   PHYSICAL EXAM:      VITALS:    Vitals:    03/21/21 1030 03/21/21 1100 03/21/21 1106 03/21/21 1115   BP: 130/73 121/70 124/79 123/60   Pulse: 62 50 56 52   Resp: (!) 34 18 21 20   Temp: 93.3 °F (34.1 °C)  93.3 °F (34.1 °C) 93.3 °F (34.1 °C)   TempSrc:    Rectal   SpO2:  91%  91%   Weight:       Height:             CONSTITUTIONAL:  awake, alert, cooperative, no apparent distress, and appears stated age  NECK:  Supple, symmetrical, trachea midline, no adenopathy, thyroid symmetric, not enlarged and no tenderness  CHEST: Chest expansion equal and symmetrical, no intercostal retraction. LUNGS:  no increased work of breathing, has expiratory wheezes both lungs, no crackles. CARDIOVASCULAR: S1 and S2, no edema and no JVD  ABDOMEN:  normal bowel sounds, non-distended and no masses palpated, and no tenderness to palpation. No hepatospleenomegaly  LYMPHADENOPATHY:  no axillary or supraclavicular adenopathy. No cervical adnenopathy  PSYCHIATRIC: Oriented to person place and time. No obvious depression or anxiety. MUSCULOSKELETAL: No obvious misalignment or effusion of the joints. No clubbing, cyanosis of the digits. RIGHT AND LEFT LOWER EXTREMITIES: No edema, no inflammation, no tenderness. SKIN:  normal skin color, texture, turgor and no redness, warmth, or swelling.  No palpable nodules    DATA:       EXAMINATION:   CTA OF THE CHEST 3/20/2021 7:04 pm       TECHNIQUE:   CTA of the chest was performed after the administration of intravenous   contrast.  Multiplanar reformatted images are provided for review.  MIP   images are provided for review. Dose modulation, iterative reconstruction,   and/or weight based adjustment of the mA/kV was utilized to reduce the   radiation dose to as low as reasonably achievable.       COMPARISON:   Chest x-ray 10/18/2020       HISTORY:   ORDERING SYSTEM PROVIDED HISTORY: new onset CHF   TECHNOLOGIST PROVIDED HISTORY:   Reason for exam:->new onset CHF   Decision Support Exception->Emergency Medical Condition (MA)   Reason for Exam: new onset of CHF   Acuity: Unknown   Type of Exam: Unknown       FINDINGS:   Pulmonary Arteries: Main pulmonary artery is enlarged 3.8 cm suggesting   pulmonary arterial hypertension.  Probable calcified granuloma involving 1 of   the right lower lobe vessels is unclear is within the lumen versus adjacent   to 1 of the right lower lobe vessels, however there is satisfactory   opacification distal to this calcific density, this is doubtful clinical   significance. .       Mediastinum: Cardiomegaly.  Pericardial effusion.  Coronary calcifications. Aortic vascular calcifications.  Esophageal wall mural thickening few   adjacent subcentimeter lymph nodes.  This could be related to esophagitis.       Lungs/pleura: Multifocal interstitial alveolar opacities both lungs may   represent multifocal pulmonary edema multifocal atypical pneumonia or typical   pneumonia.       Upper Abdomen: CT abdomen pelvis       Soft Tissues/Bones: Multilevel abdomen degenerate changes.  Multilevel   ankylosis.         Impression   No central to segmental pulmonary embolism.       Multifocal airspace disease.  Findings may be due to interstitial alveolar   edema or typical or atypical pneumonia.       Cardiomegaly coronary artery disease.  Pericardial effusion.       Distal esophageal wall mural thickening please see CT of abdomen/pelvis   differential.                                   Assessment:     1.  Ac hypoxemic resp failure  2. covid pneumonmia  3. brian pneumonia  4. ch kidney disease3          Plan:     1. D/w pt  2. Adequate o2 adm  3. vapotherm if needed  4. Bd rx  5. Received conv plasma  6. Continue decadron  7. Not a candidate for remdesivir  8. On rocephin and zithromax  9.  Thanks will follow

## 2021-03-21 NOTE — CONSULTS
[address] [facility]               [address] [facility], [address] [facility]                                  CONSULTATION    PATIENT NAME: Kathyanne Denver                     :        1921  MED REC NO:   [id number]                          ROOM:       2019  ACCOUNT NO:   [de-identified]                           ADMIT DATE: 2021  PROVIDER:     Rosalee Cm MD    CONSULT DATE:  2021    LOCATION:  The patient is in room [address] on [facility] in [facility]. CHIEF COMPLAINT:  Abnormal CAT scan with thickening of the esophagus,  etiology to be determined. HISTORY OF PRESENT ILLNESS:  The patient is a 8-year-old   American gentleman, the patient known to me, last seen in consult on  2014, with past medical history significant for hypertension,  diabetes mellitus, who presented to the emergency room on 2021  with generalized weakness over the past one week. The patient in the ER  was evaluated and the blood workup was remarkable for BUN of 35,  creatinine 1.7 and LFTs showed an AST of 89, ALT of 47, total bilirubin  was 0.7 and the CBC showed WBC count of 5.3, hemoglobin 11.9, platelet  count of 867,192. CAT scan of the chest and abdomen was performed and  showed esophageal wall mural thickening secondary to under distention,  esophagitis or reflux; however, underlying neoplasm cannot be ruled out. The patient denies dysphagia, nausea, vomiting, hematemesis, melena or  hematochezia. The patient, however, did have some loose BM prior to  coming to the hospital and stools for C. diff are in order. The patient  is on daily Protonix. The patient is hemodynamically stable. The  patient has not had a recent EGD or colonoscopy done. REVIEW OF SYSTEMS:  CENTRAL NERVOUS SYSTEM:  No history of any focal sensorimotor symptoms.   CARDIOVASCULAR SYSTEM:  No history of chest pain, but the patient  complains of shortness of breath, but no leg swelling. GENITOURINARY SYSTEM:  No history of dysuria, pyuria, or hematuria. MUSCULOSKELETAL SYSTEM:  The patient complains of generalized weakness  and fatigue. PAST MEDICAL HISTORY:  Significant for history of hypertension and  diabetes mellitus. FAMILY HISTORY:  Noncontributory. MEDICATIONS:  Please refer to the chart. SOCIOECONOMIC HISTORY:  No history of EtOH abuse. The patient does not  smoke cigarettes. PAST SURGICAL HISTORY:  The patient has had prostate surgery done,  herniorrhaphy and cholecystectomy on 12/02/2014. ALLERGIES:  No known drug allergies. PHYSICAL EXAMINATION:  GENERAL:  Shows a 8-year-old  gentleman, who is lying  flat in bed, slightly short of breath. He is awake, alert, and  oriented. VITAL SIGNS:  Please refer to the chart. HEENT:  Unremarkable. NECK:  Supple. CHEST:  There is a nonlabored breathing, coarse breath sounds present  bilaterally. HEART:  S1 and S2 are normal.  ABDOMEN:  Soft, nontender, nondistended with mild right-sided abdominal  tenderness present. Bowel sounds are present. RECTAL:  Deferred. MUSCULOSKELETAL:  Shows evidence of degenerative joint disease changes. CNS:  Shows the patient to be awake, alert, and oriented and the patient  is moving all 4 extremities. LABORATORY DATA:  The labs drawn during the present hospitalization  comprised of chem profile, which today is remarkable for BUN of 34,  creatinine of 1.6. LFTs show an AST of 89, ALT of 51 and total  bilirubin of 0.7. CBC shows WBC count of 5.1, hemoglobin of 13.8,  platelet count of 007,893. The patient's INR is 1.03. IMAGING:  CAT scan of the chest and abdomen and pelvis was done and the  patient was noted to have esophageal wall thickening on the CAT scan of  the abdomen, etiology to be determined. CAT scan of the chest showed  multifocal airspace disease.     IMPRESSION:  A 8-year-old  gentleman, presents with generalized weakness, fatigue and is noted to have COVID pneumonia. CT  showed thickening of the wall of the esophagus, rule out esophagitis  versus esophageal neoplasm. RECOMMENDATIONS:  1. Agree with present management. 2.  We will continue the patient on Protonix. 3.  After the patient recuperates from his COVID pneumonia, the patient  will need an outpatient EGD in about 2 weeks' time.         Anna Arroyo MD    D: 03/21/2021 13:13:50       T: 03/21/2021 14:14:05     [email or web address]  Job#: [id number]     Doc#: [id number]    CC:

## 2021-03-21 NOTE — PLAN OF CARE
Problem: Falls - Risk of:  Goal: Will remain free from falls  Description: Will remain free from falls  3/21/2021 1125 by Gomez Toro RN  Outcome: Ongoing  3/21/2021 0033 by Anders Madrigal RN  Outcome: Ongoing  Goal: Absence of physical injury  Description: Absence of physical injury  3/21/2021 1125 by Gomez Toro RN  Outcome: Ongoing  3/21/2021 0033 by Anders Madrigal RN  Outcome: Ongoing

## 2021-03-21 NOTE — PROGRESS NOTES
Two person skin check completed with this nurse and William RN, patient found to have no open areas at this time.

## 2021-03-21 NOTE — PROGRESS NOTES
Hospitalist Progress Note      Name:  Mayank Stiles /Age/Sex: 1921  (80 y.o. male)   MRN & CSN:  [id number] & [id number] Admission Date/Time: 3/20/2021  3:33 PM   Location:  -A PCP: Samantha Baptiste MD         Hospital Day: 2     ASSESSMENT/PLAN:  Mayank Stiles is a 80 y.o.  male who presented with worsening generalized weakness. Hypoxic with SPO2 in the 80s while on 4 L of oxygen in the ED per admission HPI. COVID-19 positive on 3/20/2021. CT chest without central or segmental PE.  CT chest with multifocal airspace disease. #.  Acute hypoxic respiratory failure & multifocal COVID-19 pneumonia--requiring up to 4 L of nasal cannula. Overall stable. Procalcitonin at 1.48. Not a candidate for remdesivir due to CrCl <30. Mary Gomez (daughter) provided consent for plasma per admission HPI. -Ceftriaxone azithromycin  -Strep urine antigen, Legionella urine antigen, MRSA nasal swab  -Eliquis 2.5 twice daily given poor kidney function (in place of Lovenox)  -Plasma  -Plasma x2 units  -Decadron 6 mg x 10 days    #. CKD III--CR at baseline  -BMP daily    #. BPH/enlarged prostate---on Flomax    #. Distal Esophageal wall thickening---noted on CT abdomen. Likely GERD related. Probably needs EGD which could be done on outpatient basis. -Protonix 40 p.o. daily  -GI consultation    #. Hypertension---BP controlled     #. Glaucoma---on eyedrops    #.   Asymptomatic bradycardia---blood pressure stable.  -Avoid beta-blocker and calcium channel blockers  -2D echo      MccarthyNayely Selby      Child    [phone]     MEDICAL DECISION MAKING:  -Labs reviewed  -Imaging reviewed  -Level of risk high  Diet DIET CARB CONTROL;   DVT Prophylaxis [] Lovenox, [x] Eliquis, [] SCDs, [] Ambulation   GI Prophylaxis [] PPI,  [] H2 Blocker,  [] Carafate,  [] Diet/Tube Feeds   Code Status Full Code   Disposition  home   MDM [] Low, [] Moderate,[x]  High     Chief complaint/Interval History/ROS     Chief Complaint: Generalized weakness    INTERVAL HISTORY:    3/21: Conversational.  Eating breakfast.  Not confused at all. Nontoxic-appearing. On 4 L of nasal cannula at the time of encounter. Updated the daughter, Margaret Duenas on the morning of 3/21. She is okay for the patient to go to skilled facility if that is recommended by PT.    ROS:  No chest pain. No abdominal pain. Objective: Intake/Output Summary (Last 24 hours) at 3/21/2021 0983  Last data filed at 3/21/2021 7929  Gross per 24 hour   Intake 240 ml   Output --   Net 240 ml      Vitals:   Vitals:    03/21/21 0919   BP: 134/76   Pulse: 50   Resp: 22   Temp: 97.6 °F (36.4 °C)   SpO2: 93%     Physical Exam:   GEN: Awake male, nontoxic appearing. Pleasant. Answers questions appropriately. EYES: No eye discharge. HENT: Mucous membranes moist.  No nasal discharge. NECK: Supple  RESP: Symmetric breath sounds. No accessory muscle use. Suboptimal air movement. No wheezing. CV: Regular rhythm. Bradycardic. No pitting lower extremity edema. GI: Abdomen soft. Nontender. Nondistended. : No Giang in place. MSK: No bony fractures. No gross deformities.   SKIN: warm, dry, no rashes,  NEURO: Cranial nerves appear grossly intact, normal speech,  PSYCH: Awake, alert, oriented     Medications:   Medications:    sodium chloride flush  10 mL Intravenous 2 times per day    enoxaparin  30 mg Subcutaneous BID    dexamethasone  6 mg Oral Daily    Vitamin D  2,000 Units Oral Daily    [Held by provider] carvedilol  25 mg Oral BID WC    [Held by provider] furosemide  20 mg Oral Daily    [Held by provider] losartan  100 mg Oral Daily    levothyroxine  50 mcg Oral Daily    tamsulosin  0.4 mg Oral Daily    atorvastatin  10 mg Oral Daily    latanoprost  1 drop Both Eyes Nightly      Infusions:    sodium chloride       PRN Meds: sodium chloride flush, 10 mL, PRN  promethazine, 12.5 mg, Q6H PRN    Or  ondansetron, 4 mg, Q6H PRN  polyethylene glycol, 17 g, Daily PRN  acetaminophen, 650 mg, Q6H PRN    Or  acetaminophen, 650 mg, Q6H PRN  sodium chloride, , PRN      Electronically signed by Cyrus Zaidi MD on 3/21/2021 at 9:55 AM

## 2021-03-21 NOTE — ED NOTES
O2 increased to 4 Lpm O2 via NC d/t O2 sat dipping to 90% on [address], [place]  03/20/21 2009

## 2021-03-21 NOTE — CONSULTS
suppository 650 mg, Q6H PRN  dexamethasone (DECADRON) tablet 6 mg, Daily  0.9 % sodium chloride infusion, PRN  [Held by provider] carvedilol (COREG) tablet 25 mg, BID WC  [Held by provider] furosemide (LASIX) tablet 20 mg, Daily  losartan (COZAAR) tablet 100 mg, Daily  levothyroxine (SYNTHROID) tablet 50 mcg, Daily  tamsulosin (FLOMAX) capsule 0.4 mg, Daily  atorvastatin (LIPITOR) tablet 10 mg, Daily  latanoprost (XALATAN) 0.005 % ophthalmic solution 1 drop, Nightly      Current Facility-Administered Medications   Medication Dose Route Frequency Provider Last Rate Last Admin    pantoprazole (PROTONIX) tablet 40 mg  40 mg Oral QAM AC Christi Fisher MD   40 mg at 03/21/21 1340    vitamin D CAPS 2,000 Units  2,000 Units Oral Daily Raheel Valente ARGUETA MD   2,000 Units at 03/21/21 1340    sodium chloride flush 0.9 % injection 10 mL  10 mL Intravenous 2 times per day Caro Puente MD   10 mL at 03/21/21 0907    sodium chloride flush 0.9 % injection 10 mL  10 mL Intravenous PRN Raheeltrav Pollack MD        enoxaparin (LOVENOX) injection 30 mg  30 mg Subcutaneous BID Raheeltrav ARGUETA MD   30 mg at 03/21/21 0905    promethazine (PHENERGAN) tablet 12.5 mg  12.5 mg Oral Q6H PRN Raheel Pollack MD        Or    ondansetron (ZOFRAN) injection 4 mg  4 mg Intravenous Q6H PRN Raheel Pollack MD        polyethylene glycol (GLYCOLAX) packet 17 g  17 g Oral Daily PRN Raheel Jing Pollack MD        acetaminophen (TYLENOL) tablet 650 mg  650 mg Oral Q6H PRN Raheel Pollack MD        Or    acetaminophen (TYLENOL) suppository 650 mg  650 mg Rectal Q6H PRN Raheel Pollack MD        dexamethasone (DECADRON) tablet 6 mg  6 mg Oral Daily Raheeltrav Pollack MD   6 mg at 03/21/21 0905    0.9 % sodium chloride infusion   Intravenous PRN Raheel Pollack MD        [Held by provider] carvedilol (COREG) tablet 25 mg  25 mg Oral BID  Raheeltrav Pollack MD        [Held by provider] furosemide (LASIX) tablet 20 mg  20 mg Oral Daily Caro Puente MD  losartan (COZAAR) tablet 100 mg  100 mg Oral Daily Raheel Walton MD        levothyroxine (SYNTHROID) tablet 50 mcg  50 mcg Oral Daily Raheel Walton MD   50 mcg at 03/21/21 0907    tamsulosin (FLOMAX) capsule 0.4 mg  0.4 mg Oral Daily Raheel Walton MD   0.4 mg at 03/21/21 0905    atorvastatin (LIPITOR) tablet 10 mg  10 mg Oral Daily Raheel Walton MD   10 mg at 03/21/21 0905    latanoprost (XALATAN) 0.005 % ophthalmic solution 1 drop  1 drop Both Eyes Nightly Raheel Walton MD             Review of Systems:     Limited review of system positive for shortness of breath denies chest pain        Physical Examination:      Vitals:    03/21/21 1743   BP:    Pulse:    Resp: 24   Temp:    SpO2:       Wt Readings from Last 3 Encounters:   03/21/21 187 lb 13.3 oz (85.2 kg)   11/09/20 193 lb 3.2 oz (87.6 kg)   10/18/20 192 lb (87.1 kg)     Body mass index is 24.12 kg/m². Due to the current efforts to prevent transmission of COVID-19 and also the need to preserve PPE for other caregivers, a face-to-face encounter with the patient was not performed. That being said, all relevant records and diagnostic tests were reviewed, including laboratory results and imaging. Please reference any relevant documentation elsewhere. Care will be coordinated with the primary service. Lab Review     Recent Labs     03/21/21  0450   WBC 5.1   HGB 13.8   HCT 41.6*         Recent Labs     03/21/21  0450   *   K 3.9   CL 98*   CO2 21   BUN 34*   CREATININE 1.6*     Recent Labs     03/21/21  0450   AST 89*   ALT 51*   BILITOT 0.7   ALKPHOS 92     No results for input(s): TROPONINI in the last 72 hours. No results found for: BNP  Lab Results   Component Value Date    INR 1.03 03/21/2021    PROTIME 12.5 03/21/2021         All labs, images, EKGs were personally reviewed      Assessment: 89 y. o.year old with PMH of  has a past medical history of Diabetes mellitus ([address] and Hypertension.

## 2021-03-22 LAB
BASOPHILS ABSOLUTE: 0 K/CU MM
BASOPHILS RELATIVE PERCENT: 0.1 % (ref 0–1)
COMPONENT: NORMAL
DIFFERENTIAL TYPE: ABNORMAL
EOSINOPHILS ABSOLUTE: 0 K/CU MM
EOSINOPHILS RELATIVE PERCENT: 0 % (ref 0–3)
HCT VFR BLD CALC: 37.1 % (ref 42–52)
HEMOGLOBIN: 12.7 GM/DL (ref 13.5–18)
IMMATURE NEUTROPHIL %: 0.9 % (ref 0–0.43)
LYMPHOCYTES ABSOLUTE: 0.5 K/CU MM
LYMPHOCYTES RELATIVE PERCENT: 3.4 % (ref 24–44)
Lab: 2
MCH RBC QN AUTO: 30.5 PG (ref 27–31)
MCHC RBC AUTO-ENTMCNC: 34.2 % (ref 32–36)
MCV RBC AUTO: 89 FL (ref 78–100)
MONOCYTES ABSOLUTE: 0.3 K/CU MM
MONOCYTES RELATIVE PERCENT: 2.2 % (ref 0–4)
PDW BLD-RTO: 13.8 % (ref 11.7–14.9)
PLATELET # BLD: 216 K/CU MM (ref 140–440)
PMV BLD AUTO: 11.7 FL (ref 7.5–11.1)
RBC # BLD: 4.17 M/CU MM (ref 4.6–6.2)
SEGMENTED NEUTROPHILS ABSOLUTE COUNT: 13.9 K/CU MM
SEGMENTED NEUTROPHILS RELATIVE PERCENT: 93.4 % (ref 36–66)
STATUS: NORMAL
STATUS: NORMAL
TOTAL IMMATURE NEUTOROPHIL: 0.13 K/CU MM
TRANSFUSION STATUS: NORMAL
TRANSFUSION STATUS: NORMAL
UNIT DIVISION: NORMAL
UNIT DIVISION: NORMAL
UNIT NUMBER: NORMAL
UNIT NUMBER: NORMAL
WBC # BLD: 14.8 K/CU MM (ref 4–10.5)

## 2021-03-22 PROCEDURE — 93308 TTE F-UP OR LMTD: CPT

## 2021-03-22 PROCEDURE — 2060000000 HC ICU INTERMEDIATE R&B

## 2021-03-22 PROCEDURE — 2580000003 HC RX 258: Performed by: INTERNAL MEDICINE

## 2021-03-22 PROCEDURE — 6370000000 HC RX 637 (ALT 250 FOR IP): Performed by: INTERNAL MEDICINE

## 2021-03-22 PROCEDURE — 94761 N-INVAS EAR/PLS OXIMETRY MLT: CPT

## 2021-03-22 PROCEDURE — 99233 SBSQ HOSP IP/OBS HIGH 50: CPT | Performed by: INTERNAL MEDICINE

## 2021-03-22 PROCEDURE — 6360000002 HC RX W HCPCS: Performed by: INTERNAL MEDICINE

## 2021-03-22 PROCEDURE — 80048 BASIC METABOLIC PNL TOTAL CA: CPT

## 2021-03-22 PROCEDURE — 2700000000 HC OXYGEN THERAPY PER DAY

## 2021-03-22 PROCEDURE — 76937 US GUIDE VASCULAR ACCESS: CPT

## 2021-03-22 PROCEDURE — 85025 COMPLETE CBC W/AUTO DIFF WBC: CPT

## 2021-03-22 RX ADMIN — ATORVASTATIN CALCIUM 10 MG: 10 TABLET, FILM COATED ORAL at 08:39

## 2021-03-22 RX ADMIN — APIXABAN 2.5 MG: 2.5 TABLET, FILM COATED ORAL at 20:47

## 2021-03-22 RX ADMIN — DEXAMETHASONE 6 MG: 4 TABLET ORAL at 08:39

## 2021-03-22 RX ADMIN — PANTOPRAZOLE SODIUM 40 MG: 40 TABLET, DELAYED RELEASE ORAL at 06:12

## 2021-03-22 RX ADMIN — SODIUM CHLORIDE, PRESERVATIVE FREE 10 ML: 5 INJECTION INTRAVENOUS at 09:00

## 2021-03-22 RX ADMIN — ENOXAPARIN SODIUM 30 MG: 30 INJECTION SUBCUTANEOUS at 08:39

## 2021-03-22 RX ADMIN — SODIUM CHLORIDE, PRESERVATIVE FREE 10 ML: 5 INJECTION INTRAVENOUS at 20:47

## 2021-03-22 RX ADMIN — LATANOPROST 1 DROP: 50 SOLUTION/ DROPS OPHTHALMIC at 21:53

## 2021-03-22 RX ADMIN — Medication 2000 UNITS: at 08:39

## 2021-03-22 RX ADMIN — LOSARTAN POTASSIUM 100 MG: 50 TABLET, FILM COATED ORAL at 08:39

## 2021-03-22 RX ADMIN — TAMSULOSIN HYDROCHLORIDE 0.4 MG: 0.4 CAPSULE ORAL at 08:39

## 2021-03-22 RX ADMIN — LEVOTHYROXINE SODIUM 50 MCG: 50 TABLET ORAL at 06:12

## 2021-03-22 ASSESSMENT — PAIN SCALES - GENERAL
PAINLEVEL_OUTOF10: 0
PAINLEVEL_OUTOF10: 0

## 2021-03-22 NOTE — PROGRESS NOTES
CARDIOLOGY PROGRESS NOTE                                                  Name:  Bell Garduno /Age/Sex: 1921  (Kay  [age] y.o. male)   MRN & CSN:  [id number] & [id number] Admission Date/Time: 3/20/2021  3:33 PM   Location:  -A PCP: Marialuisa Wen MD         Admit Date:  3/20/2021  Hospital Day: 3      SUBJECTIVE:   Seen patient as follow up as consultation for pericardial effusion    No chest pain. + shortness of breath  No palpations    TELEMETRY: Sinus       Intake/Output Summary (Last 24 hours) at 3/22/2021 1013  Last data filed at 3/21/2021 1844  Gross per 24 hour   Intake 1163 ml   Output --   Net 1163 ml       Assessment/Plan:     1. Acute hypoxic respiratory failure COVID-19 pneumonia on high flow oxygen antibiotics low-dose Eliquis and steroid therapy. 2. Pericardial effusion: CT scan personally reviewed shows small pericardial effusion. Will obtain echocardiogram to confirm today   3. History of diastolic heart failure: Lasix on hold due to renal failure  4. Essential hypertension: Blood pressure controlled  5. Asymptomatic bradycardia avoid beta-blocker and calcium blocker echo as above. Past medical history:    has a past medical history of Diabetes mellitus ([address] and Hypertension. Past surgical history:   has a past surgical history that includes hernia repair; Prostate surgery; and Cholecystectomy (14). Social History:   reports that he has never smoked. He has never used smokeless tobacco. He reports that he does not drink alcohol or use drugs. Family history:  family history is not on file.     OBJECTIVE:     BP (!) 147/70   Pulse 61   Temp 96.6 °F (35.9 °C) (Rectal)   Resp 26   Ht 6' 2\" (1.88 m)   Wt 188 lb 7.9 oz (85.5 kg)   SpO2 92%   BMI 24.20 kg/m²       Intake/Output Summary (Last 24 hours) at 3/22/2021 1013  Last data filed at 3/21/2021 1844  Gross per 24 hour   Intake 1163 ml   Output --   Net 1163 ml       Physical Exam:    Constitutional: Well developed, Well nourished, No acute distress, Non-toxic appearance. HENT:  Normocephalic, Atraumatic   Eyes:  No discharge. Respiratory:  Normal breath sounds, + respiratory distress, + wheezing, No chest tenderness. ,no use of accessory muscles, diaphragm movement is normal  Cardiovascular S1-S2 No Murmurs, added sounds. Normal rate rhythm. No rubs gallops. Carotid pulses and amplitude are normal no bruit noted. Pedal pulses normal femoral pulses normal.  No pedal edema  GI:  Bowel sounds normal, Soft, No tenderness  : No CVA tenderness. Musculoskeletal: No edema, No tenderness, No cyanosis, No clubbing   Integument:  Warm, Dry, No erythema, No rash. Lymphatic:  No lymphadenopathy noted.    Neurologic:  Alert    Psychiatric:  Affect normal, Judgment normal, Mood normal.           MEDICATIONS:     pantoprazole  40 mg Oral QAM AC    vitamin D  2,000 Units Oral Daily    sodium chloride flush  10 mL Intravenous 2 times per day    enoxaparin  30 mg Subcutaneous BID    dexamethasone  6 mg Oral Daily    [Held by provider] carvedilol  25 mg Oral BID WC    [Held by provider] furosemide  20 mg Oral Daily    losartan  100 mg Oral Daily    levothyroxine  50 mcg Oral Daily    tamsulosin  0.4 mg Oral Daily    atorvastatin  10 mg Oral Daily    latanoprost  1 drop Both Eyes Nightly      sodium chloride       sodium chloride flush, promethazine **OR** ondansetron, polyethylene glycol, acetaminophen **OR** acetaminophen, sodium chloride  No Known Allergies    Lab Data:  CBC:   Recent Labs     03/20/21 1651 03/21/21  0450 03/22/21  0620   WBC 5.3 5.1 14.8*   HGB 11.9* 13.8 12.7*   HCT 35.0* 41.6* 37.1*   MCV 90.7 91.0 89.0    172 216     BMP:   Recent Labs     03/20/21 1651 03/21/21  0450   * 133*   K 3.8 3.9   CL 97* 98*   CO2 23 21   BUN 35* 34*   CREATININE 1.7* 1.6*     LIVER PROFILE:   Recent Labs     03/20/21 1651 03/21/21  0450   AST 89* 89*   ALT 47* 51*   LIPASE 24  --    BILITOT 0.7 0.7   ALKPHOS 75 92     PT/INR:   Recent Labs     03/21/21  0450   PROTIME 12.5   INR 1.03     APTT:   Recent Labs     03/21/21  0450   APTT 41.8*     BNP:  No results for input(s): BNP in the last 72 hours.       Jossue Nazario MD 3/22/2021 10:13 AM

## 2021-03-22 NOTE — PROGRESS NOTES
pulmonary      SUBJECTIVE: comfortable breathing     OBJECTIVE    VITALS:  BP (!) 157/75   Pulse 60   Temp 97.3 °F (36.3 °C) (Axillary)   Resp (!) 31   Ht 6' 2\" (1.88 m)   Wt 188 lb 7.9 oz (85.5 kg)   SpO2 100%   BMI 24.20 kg/m²   HEAD AND FACE EXAM:  No throat injection, no active exudate,no thrush  NECK EXAM;No JVD, no masses, symmetrical  CHEST EXAM; Expansion equal and symmetrical, no masses  LUNG EXAM; Good breath sounds bilaterally. There are expiratory wheezes both lungs, there are crackles at both lung bases  CARDIOVASCULAR EXAM: Positive S1 and S2, no S3 or S4, no clicks ,no murmurs  RIGHT AND LEFT LOWER EXTRIMITY EXAM: No edema, no swelling, no inflamation            LABS   Lab Results   Component Value Date    WBC 14.8 (H) 03/22/2021    HGB 12.7 (L) 03/22/2021    HCT 37.1 (L) 03/22/2021    MCV 89.0 03/22/2021     03/22/2021     Lab Results   Component Value Date    CREATININE 1.6 (H) 03/21/2021    BUN 34 (H) 03/21/2021     (L) 03/21/2021    K 3.9 03/21/2021    CL 98 (L) 03/21/2021    CO2 21 03/21/2021     Lab Results   Component Value Date    INR 1.03 03/21/2021    PROTIME 12.5 03/21/2021          Lab Results   Component Value Date    PHOS 2.5 12/06/2014      No results for input(s): PH, PO2ART, PUB6PNY, HCO3, BEART, O2SAT in the last 72 hours. Wt Readings from Last 3 Encounters:   03/22/21 188 lb 7.9 oz (85.5 kg)   11/09/20 193 lb 3.2 oz (87.6 kg)   10/18/20 192 lb (87.1 kg)               ASSESMENT  Ac resp failure  covid pneumonia  brian pneumonia        PLAN  1. cpm  2.  Cont o2    3/22/2021  Monet Kapadia M.D.

## 2021-03-22 NOTE — CONSULTS
IV Consult complete. Nexiva 20g 1.75\" Extra Long PIV inserted in right FA x1 attempt using ultrasound guidance. Brisk blood return, flushes easy.   Protective sleeve applied to right FA to prevent accidental removal.

## 2021-03-22 NOTE — CARE COORDINATION
Patient admitted to [facility] unit and per medical record review and physician note patient is a limited historian and daughter Matilde has been POC for staff. Phoned and spoke with Matilde. She confirmed that patient is normally from home alone and was independent with ADL's and ambulated with a cane prior to becoming ill. Patient has PCP and insurance to assist with RX coverage . CM discussed discharge planning with Matilde and she confirmed being ok with SNF if therapy recommends it for patient on discharge for rehab. CM let her know that PT/OT evals have been ordered but are pending at this time. CM also let Matilde know that SNF choices for COVID patients are very limited at this time but CM can keep her updated and she voiced understanding. Discharge plan TBD at this time.  Case Management to follow

## 2021-03-23 LAB
ANION GAP SERPL CALCULATED.3IONS-SCNC: 11 MMOL/L (ref 4–16)
BASOPHILS ABSOLUTE: 0 K/CU MM
BASOPHILS RELATIVE PERCENT: 0.1 % (ref 0–1)
BUN BLDV-MCNC: 35 MG/DL (ref 6–23)
CALCIUM SERPL-MCNC: 8.3 MG/DL (ref 8.3–10.6)
CHLORIDE BLD-SCNC: 102 MMOL/L (ref 99–110)
CO2: 22 MMOL/L (ref 21–32)
CREAT SERPL-MCNC: 1.5 MG/DL (ref 0.9–1.3)
DIFFERENTIAL TYPE: ABNORMAL
EOSINOPHILS ABSOLUTE: 0 K/CU MM
EOSINOPHILS RELATIVE PERCENT: 0 % (ref 0–3)
GFR AFRICAN AMERICAN: 52 ML/MIN/1.73M2
GFR NON-AFRICAN AMERICAN: 43 ML/MIN/1.73M2
GLUCOSE BLD-MCNC: 344 MG/DL (ref 70–99)
HCT VFR BLD CALC: 38 % (ref 42–52)
HEMOGLOBIN: 13 GM/DL (ref 13.5–18)
IMMATURE NEUTROPHIL %: 1.3 % (ref 0–0.43)
LYMPHOCYTES ABSOLUTE: 0.5 K/CU MM
LYMPHOCYTES RELATIVE PERCENT: 3.7 % (ref 24–44)
MCH RBC QN AUTO: 30.5 PG (ref 27–31)
MCHC RBC AUTO-ENTMCNC: 34.2 % (ref 32–36)
MCV RBC AUTO: 89.2 FL (ref 78–100)
MONOCYTES ABSOLUTE: 0.4 K/CU MM
MONOCYTES RELATIVE PERCENT: 2.6 % (ref 0–4)
NUCLEATED RBC %: 0 %
PDW BLD-RTO: 13.8 % (ref 11.7–14.9)
PLATELET # BLD: 211 K/CU MM (ref 140–440)
PMV BLD AUTO: 10.4 FL (ref 7.5–11.1)
POTASSIUM SERPL-SCNC: 3.9 MMOL/L (ref 3.5–5.1)
RBC # BLD: 4.26 M/CU MM (ref 4.6–6.2)
SEGMENTED NEUTROPHILS ABSOLUTE COUNT: 12.5 K/CU MM
SEGMENTED NEUTROPHILS RELATIVE PERCENT: 92.3 % (ref 36–66)
SODIUM BLD-SCNC: 135 MMOL/L (ref 135–145)
TOTAL IMMATURE NEUTOROPHIL: 0.17 K/CU MM
TOTAL NUCLEATED RBC: 0 K/CU MM
WBC # BLD: 13.6 K/CU MM (ref 4–10.5)

## 2021-03-23 PROCEDURE — 86141 C-REACTIVE PROTEIN HS: CPT

## 2021-03-23 PROCEDURE — 97162 PT EVAL MOD COMPLEX 30 MIN: CPT

## 2021-03-23 PROCEDURE — 6370000000 HC RX 637 (ALT 250 FOR IP): Performed by: INTERNAL MEDICINE

## 2021-03-23 PROCEDURE — 97530 THERAPEUTIC ACTIVITIES: CPT

## 2021-03-23 PROCEDURE — 80048 BASIC METABOLIC PNL TOTAL CA: CPT

## 2021-03-23 PROCEDURE — 2700000000 HC OXYGEN THERAPY PER DAY

## 2021-03-23 PROCEDURE — 99232 SBSQ HOSP IP/OBS MODERATE 35: CPT | Performed by: INTERNAL MEDICINE

## 2021-03-23 PROCEDURE — 2060000000 HC ICU INTERMEDIATE R&B

## 2021-03-23 PROCEDURE — 97166 OT EVAL MOD COMPLEX 45 MIN: CPT

## 2021-03-23 PROCEDURE — 2580000003 HC RX 258: Performed by: INTERNAL MEDICINE

## 2021-03-23 PROCEDURE — 6360000002 HC RX W HCPCS: Performed by: INTERNAL MEDICINE

## 2021-03-23 PROCEDURE — 85025 COMPLETE CBC W/AUTO DIFF WBC: CPT

## 2021-03-23 RX ADMIN — SODIUM CHLORIDE, PRESERVATIVE FREE 10 ML: 5 INJECTION INTRAVENOUS at 21:02

## 2021-03-23 RX ADMIN — LATANOPROST 1 DROP: 50 SOLUTION/ DROPS OPHTHALMIC at 21:02

## 2021-03-23 RX ADMIN — LEVOTHYROXINE SODIUM 50 MCG: 50 TABLET ORAL at 05:51

## 2021-03-23 RX ADMIN — PANTOPRAZOLE SODIUM 40 MG: 40 TABLET, DELAYED RELEASE ORAL at 05:51

## 2021-03-23 RX ADMIN — CEFTRIAXONE 1000 MG: 1 INJECTION, POWDER, FOR SOLUTION INTRAMUSCULAR; INTRAVENOUS at 09:49

## 2021-03-23 RX ADMIN — APIXABAN 2.5 MG: 2.5 TABLET, FILM COATED ORAL at 21:02

## 2021-03-23 RX ADMIN — AZITHROMYCIN MONOHYDRATE 500 MG: 500 INJECTION, POWDER, LYOPHILIZED, FOR SOLUTION INTRAVENOUS at 10:29

## 2021-03-23 NOTE — PROGRESS NOTES
pulmonary      SUBJECTIVE:  stable     OBJECTIVE    VITALS:  BP (!) 165/96   Pulse 69   Temp 97.6 °F (36.4 °C) (Rectal)   Resp 19   Ht 6' 2\" (1.88 m)   Wt 188 lb 7.9 oz (85.5 kg)   SpO2 94%   BMI 24.20 kg/m²   HEAD AND FACE EXAM:  No throat injection, no active exudate,no thrush  NECK EXAM;No JVD, no masses, symmetrical  CHEST EXAM; Expansion equal and symmetrical, no masses  LUNG EXAM; Good breath sounds bilaterally. There are expiratory wheezes both lungs, there are crackles at both lung bases  CARDIOVASCULAR EXAM: Positive S1 and S2, no S3 or S4, no clicks ,no murmurs  RIGHT AND LEFT LOWER EXTRIMITY EXAM: No edema, no swelling, no inflamation            LABS   Lab Results   Component Value Date    WBC 13.6 (H) 03/23/2021    HGB 13.0 (L) 03/23/2021    HCT 38.0 (L) 03/23/2021    MCV 89.2 03/23/2021     03/23/2021     Lab Results   Component Value Date    CREATININE 1.5 (H) 03/23/2021    BUN 35 (H) 03/23/2021     03/23/2021    K 3.9 03/23/2021     03/23/2021    CO2 22 03/23/2021     Lab Results   Component Value Date    INR 1.03 03/21/2021    PROTIME 12.5 03/21/2021          Lab Results   Component Value Date    PHOS 2.5 12/06/2014      No results for input(s): PH, PO2ART, QSP1IIO, HCO3, BEART, O2SAT in the last 72 hours. Wt Readings from Last 3 Encounters:   03/22/21 188 lb 7.9 oz (85.5 kg)   11/09/20 193 lb 3.2 oz (87.6 kg)   10/18/20 192 lb (87.1 kg)               ASSESMENT  Ac resp failure  covid pneumonia  brian pneumonia        PLAN  1. cpm  2.  Cont o2    3/23/2021  Kj Hussein M.D.

## 2021-03-23 NOTE — CARE COORDINATION
Spoke with Vish from [facility] per request from patient's daughter Josselyn Lee regarding patient being able to go to [facility] on discharge. Zhen stated they are not accepting COVID patient's until they are past their 14 day quarantine. Phoned and spoke with BODØ at [place] to also check with them per [facility] request. BODØ stated that she will have to check  and return call to .

## 2021-03-23 NOTE — CONSULTS
[facility] PHYSICAL THERAPY EVALUATION  Bell Garduno, 1921, -A, 3/23/2021    History  [place]:  The encounter diagnosis was Pneumonia due to COVID-19 virus. Patient  has a past medical history of Diabetes mellitus ([address] and Hypertension. Patient  has a past surgical history that includes hernia repair; Prostate surgery; and Cholecystectomy (14). Subjective:  Patient states:  \"You're tickling me! \". Pain:  No c/o. Communication with other providers:  Handoff to RN, co-eval with OT. Restrictions: Fall risk    Home Setup/Prior level of function  Per charting patient lives alone and is independent with ADLs and mobility, ambulating with cane. Examination of body systems (includes body structures/functions, activity/participation limitations):  · Observation:  Supine in bed upon arrival  · Vision:  R eye blind? · Hearing:  Very [place]  · Cardiopulmonary: On airvo  · Cognition: difficult to assess d/t [place], see OT/SLP note for further evaluation. Musculoskeletal  · ROM R/L:  WFL. · Strength R/L:  4/5, weakness in function and endurance. · Neuro:  No focal deficits, min difficulty sequencing likely d/t [place]    · Gait pattern: NT    Mobility:  · Supine to sit:  Mod A  · Transfers: Min-mod A  · Sitting balance:  SBA. · Standing balance:  CGA. · Gait: Min A    [facility] 6 Clicks Inpatient Mobility:       Treatment:  Sup to sit: able to manage LE, mod A to boost trunk, HOB elevated, cues for use of bedrail. Sitting balance: good, increased time for scooting EOB, impaired efficiency of managing hips. Transfers: STS from bed x2, initially mod-max A, poor sequencing, improved on second trial min A, improved understanding of task. Min A to take steps to chair. Safety: patient left in chair with chair alarm, call light within reach, RN notified, gait belt used. Assessment:  Patient is a Kay Juan [age] yo male who presents with COVID and SOB.   Patient would benefit from skilled PT services to return to baseline, requiring min-mod A for mobility at this time. Rec d/c to ideally to ARU or swing bed, aware that this d/c is unlikely d/t COVID. Complexity: Moderate  Prognosis: Good, no significant barriers to participation at this time. Plan Times per week: 2-3+/week, 1 week,      Equipment: TBD    Goals:  Short term goals  Time Frame for Short term goals: 1 week  Short term goal 1: Patient will perform supine to sit mod I. Short term goal 2: Patient will perform STS with CGA and LRAD. Short term goal 3: Patient will ambulate x10ft with CGA and LRAD. Treatment plan:  Bed mobility, transfers, balance, gait, TA, TX. Recommendations for NURSING mobility: stand pivot to chair with gait belt.     Time:   Time in: 1136  Time out: 1156  Timed treatment minutes: 10  Total time: 20    Electronically signed by:    Dieter Cormier, PT  3/23/2021, 4:08 PM

## 2021-03-23 NOTE — CARE COORDINATION
Phoned and provided update for patient's daughter Paula Marquez. CM let her know that [facility] are not accepting COVID patient 's until they are past quarantine and she voiced understanding. CM also let her know that OT eval completed and is recommending SNF but PT eval not completed yet . Patient on Vapotherm. Case Management to follow to continue to assist with discharge plan/needs.

## 2021-03-23 NOTE — PROGRESS NOTES
CARDIOLOGY PROGRESS NOTE                                                  Name:  Helen Pandey /Age/Sex: 1921  (Kay  [age] y.o. male)   MRN & CSN:  [id number] & [id number] Admission Date/Time: 3/20/2021  3:33 PM   Location:  -A PCP: Romeo Pitts MD         Admit Date:  3/20/2021  Hospital Day: 4      SUBJECTIVE:   Seen patient as follow up as consultation for pericardial effusion    No chest pain. + shortness of breath  No palpations    TELEMETRY: Sinus       Intake/Output Summary (Last 24 hours) at 3/23/2021 1627  Last data filed at 3/22/2021 2213  Gross per 24 hour   Intake 10 ml   Output 1250 ml   Net -1240 ml       Assessment/Plan:     1. Acute hypoxic respiratory failure COVID- pneumonia on high flow oxygen antibiotics low-dose Eliquis and steroid therapy. 2. Pericardial effusion: CT scan personally reviewed shows trace pericardial effusion. Echocardiogram does not show effusion. 3. History of diastolic heart failure: Lasix on hold due to renal failure  4. Essential hypertension: Blood pressure controlled  5. Asymptomatic bradycardia avoid beta-blocker and calcium blocker echo as above. Cardiology will sign off. Call with questions     Echo   Summary   Expedited imaging was used to obtain protocol images to reduce the   sonographer's exposure during COVID-19 pandemic. Left ventricular systolic function is normal.   Ejection fraction is visually estimated at 60%. Mild left ventricular hypertrophy. Mild aortic stenosis. Mild mitral annular calcification is present. No evidence of any pericardial effusion. Past medical history:    has a past medical history of Diabetes mellitus ([address] and Hypertension. Past surgical history:   has a past surgical history that includes hernia repair; Prostate surgery; and Cholecystectomy (14). Social History:   reports that he has never smoked.  He has never used smokeless tobacco. He reports that he does not drink polyethylene glycol, acetaminophen **OR** acetaminophen, sodium chloride  No Known Allergies    Lab Data:  CBC:   Recent Labs     03/21/21  0450 03/22/21  0620 03/23/21  0415   WBC 5.1 14.8* 13.6*   HGB 13.8 12.7* 13.0*   HCT 41.6* 37.1* 38.0*   MCV 91.0 89.0 89.2    216 211     BMP:   Recent Labs     03/20/21  1651 03/21/21  0450 03/23/21  0415   * 133* 135   K 3.8 3.9 3.9   CL 97* 98* 102   CO2 23 21 22   BUN 35* 34* 35*   CREATININE 1.7* 1.6* 1.5*     LIVER PROFILE:   Recent Labs     03/20/21  1651 03/21/21  0450   AST 89* 89*   ALT 47* 51*   LIPASE 24  --    BILITOT 0.7 0.7   ALKPHOS 75 92     PT/INR:   Recent Labs     03/21/21  0450   PROTIME 12.5   INR 1.03     APTT:   Recent Labs     03/21/21  0450   APTT 41.8*     BNP:  No results for input(s): BNP in the last 72 hours.       Gage Antoine MD 3/23/2021 4:27 PM

## 2021-03-23 NOTE — PROGRESS NOTES
Occupational Therapy  [facility] OCCUPATIONAL THERAPY EVALUATION    History  [place]:  The encounter diagnosis was Pneumonia due to COVID-19 virus. Restrictions:                           Communication with other providers: RN, PT    Subjective:  Patient states:  \"hi!\"  Pain:  No c/o  Patient goal:  Not identified    Occupational profile (relevant social history and personal factors):         Examination of body systems (includes body structures/functions, activity/participation limitations):  · Orientation: ox self, overall WFL, did not quiz further  · Cognition:  Follows commands c repetition and tactile cues as needed. Hardness of hearing may be mistaken for reduced comprehension. · Observation:  Received pt in bed. Alert and cooperative  · Cardioulm: vapotherm. Vitals stable throughout. 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 · Hearing:  Very [place]  · ROM:  WFL BUE  · Strength: WFL BUE  · Sensation: not tested    ADLs  Feeding: SBA, cues    Grooming: SBA, cues in seated    Dressing: UB Carlos Enrique in seated LB ModA at least.     Bathing: UB Carlos Enrique in seated LB ModA    Toileting: likely ModA, BSC only due to vapotherm    *Some ADL determined per observation of actual ADL performance, functional mobility, balance, activity tolerance, and cognition. AM-PAC 6 click short form for inpatient daily activity:  Raw Score: 15  24/24 = unimpaired  23/24 = 1-20% impaired   20/24-22/24 = 21-40% impaired  15/24-19/24 = 41-59% impaired   10/24-14/24 = 60%-79% impaired  7/24-9/24 = 80%-99% impaired  6/24 = 100% impaired    Functional Mobility  Bed mobility:   Supine to sit: ModA for trunk lift after rolling to sidelying    Sitting balance: SBA    Transfers:   Sit to stand: difficulty sequencing first attempt and c heavy posterior lean.  Improved second attempt and only requiring Carlos Enrique for lift and steadying c RW  Stand to sit: Carlos Enrique to chair, cue for UE reach back    Standing balance: CGA static stand with walker    Ambulation:  Carlos Enrique for steadying with RW 4 feet to chair. Distance limited due to vapotherm line. Activity tolerance  WFL considering age and diagnosis. He is below his high level baseline. Assessment:  Assessment  Performance deficits / Impairments: Decreased functional mobility , Decreased ADL status, Decreased high-level IADLs, Decreased balance, Decreased safe awareness, Decreased endurance  Treatment Diagnosis: covid-19 PNA  Prognosis: Good  Decision Making: Medium Complexity  REQUIRES OT FOLLOW UP: Yes  Discharge Recommendations: Subacute/Skilled Nursing Facility      Goals:  By d/c or goals met:     Pt will perform all bed mobility with Carlos Enrique in prep for EOB/OOB activity. Pt will perform all functional transfers with Carlos Enrique and appropriate use of LRD to bed, toilet, chair in prep for increased functional independence. Pt will perform UB ADLs with CGA to increase functional independence. Pt will perform LB ADLs with Carlos Enrique to increase functional independence. Pt will perform all aspects of toileting with Carlos Enrique to increase functional independence. Pt will participate in therex/therax c emphasis on strength, activity tolerance,  safety, NICOLETTE tasks. Plan:  Plan  Times per week: 2x      Recommendation for activity with nursing staff:  Carlos Enrique to pivot to from chair or [facility] c walker. Treatment today:    Therapeutic Activity Training:   Therapeutic activity training was instructed today. Cues were given for safety, sequence, UE/LE placement, visual cues, and balance. See above Functional Mobility section. Education: Role of OT, OT POC, d/c needs, home safety    Safety: Left in chair with all needs in reach. chair alarm applied. Gait belt used for transfer and mobility. Time in:  1135  Time out:  1155  Timed treatment minutes:  8  Total treatment time:  20    Electronically signed by:    Mitali Gutierrez, OTR/L, [place]   [id number]   1:21 PM, 3/23/2021

## 2021-03-23 NOTE — CARE COORDINATION
Received return call from Samira Watts at [facility]. She stated that they are not accepting COVID patients until they are past a 10-14 day quarantine.

## 2021-03-23 NOTE — PROGRESS NOTES
Hospitalist Progress Note      Name:  Barak Mendoza /Age/Sex: 1921  (80 y.o. male)   MRN & CSN:  [id number] & [id number] Admission Date/Time: 3/20/2021  3:33 PM   Location:  -A PCP: Aly Escobedo MD         Hospital Day: 4    Assessment and Plan:   Barak Mendoza is a 80 y.o.  male  who presents with SOB    1) Acute Hypoxic Respiratory failure due to COVID-19 and possible gram positive pneumonia  -CTA chest with multifocal airspace disease. No PE  -COVID-19 positive on 3/20/2021.  -Follow MRSA, Strep and legionella Ag  -Trend CRP and Procal  -S/P CP; not a candidate for remdesivir due to kidney function  -Start IV Abx  -Continue decadron for 10 days  -Wean off O2 as tolerated  -Spoke with Daughter Margaret Duenas; confirmed patient's code status as full code. 2) Asymptomatic bradycardia  -Avoid beta-blocker and calcium channel blockers  -2D echo essential normal; no evidence of pericardial effusion    3) Distal Esophageal wall thickening  -Noted on CT abdomen. Likely GERD related.    -Evaluated by GI. Plan for outpatient EGD in 2 weeks after discharge.  -Protonix 40 p.o. daily    Other Chronic medical condition; medication resumed unless contraindicated     Essential Hypertension     Glaucoma     CKD III     BPH/enlarged prostate         Diet DIET CARB CONTROL;   DVT Prophylaxis [] Lovenox, []  Heparin, [] SCDs, [] Ambulation   GI Prophylaxis [] PPI,  [] H2 Blocker,  [] Carafate,  [] Diet/Tube Feeds   Code Status Full Code   Disposition TBD   MDM      History of Present Illness:     Patient was seen and examined  Denied any chest pain or worsening SOB  No palpitations, fever, chills  No acute event overnight    Ten point ROS reviewed negative, unless as noted above    Objective:        Intake/Output Summary (Last 24 hours) at 3/23/2021 1343  Last data filed at 3/22/2021 2213  Gross per 24 hour   Intake 10 ml   Output 1250 ml   Net -1240 ml      Vitals:   Vitals:    21 1028   BP: (!) 165/96   Pulse: 69   Resp: 19   Temp:    SpO2: 94%     Physical Exam:   GEN Awake male, sitting upright in bed in no apparent distress. Appears given age. EYES Pupils are equally round. No scleral erythema, discharge, or conjunctivitis. HENT Mucous membranes are moist. Oral pharynx without exudates, no evidence of thrush. NECK Supple, no apparent thyromegaly or masses. RESP Clear to auscultation, no wheezes, rales or rhonchi. Symmetric chest movement while on vapotherm  CARDIO/VASC S1/S2 auscultated. Regular rate without appreciable murmurs, rubs, or gallops. No JVD or carotid bruits. Peripheral pulses equal bilaterally and palpable. No peripheral edema. GI Abdomen is soft without significant tenderness, masses, or guarding. Bowel sounds are normoactive. Rectal exam deferred.  No costovertebral angle tenderness. Giang catheter is not present. HEME/LYMPH No palpable cervical lymphadenopathy and no hepatosplenomegaly. No petechiae or ecchymoses. MSK No gross joint deformities. SKIN Normal coloration, warm, dry. NEURO Cranial nerves appear grossly intact, normal speech, no lateralizing weakness. PSYCH Awake, alert. Affect appropriate.     Medications:   Medications:    cefTRIAXone (ROCEPHIN) IV  1,000 mg Intravenous Q24H    azithromycin  500 mg Intravenous Q24H    apixaban  2.5 mg Oral BID    pantoprazole  40 mg Oral QAM AC    vitamin D  2,000 Units Oral Daily    sodium chloride flush  10 mL Intravenous 2 times per day    dexamethasone  6 mg Oral Daily    [Held by provider] carvedilol  25 mg Oral BID WC    [Held by provider] furosemide  20 mg Oral Daily    losartan  100 mg Oral Daily    levothyroxine  50 mcg Oral Daily    tamsulosin  0.4 mg Oral Daily    atorvastatin  10 mg Oral Daily    latanoprost  1 drop Both Eyes Nightly      Infusions:    sodium chloride       PRN Meds: sodium chloride flush, 10 mL, PRN  promethazine, 12.5 mg, Q6H PRN    Or  ondansetron, 4 mg, Q6H PRN  polyethylene glycol, 17 g, Daily PRN  acetaminophen, 650 mg, Q6H PRN    Or  acetaminophen, 650 mg, Q6H PRN  sodium chloride, , PRN          Electronically signed by Carmelina Anglin MD on 3/23/2021 at 1:43 PM

## 2021-03-24 LAB
GLUCOSE BLD-MCNC: 157 MG/DL (ref 70–99)
GLUCOSE BLD-MCNC: 174 MG/DL (ref 70–99)
GLUCOSE BLD-MCNC: 311 MG/DL (ref 70–99)
HIGH SENSITIVE C-REACTIVE PROTEIN: 68.1 MG/L
PROCALCITONIN: 0.41

## 2021-03-24 PROCEDURE — 6370000000 HC RX 637 (ALT 250 FOR IP): Performed by: INTERNAL MEDICINE

## 2021-03-24 PROCEDURE — 94761 N-INVAS EAR/PLS OXIMETRY MLT: CPT

## 2021-03-24 PROCEDURE — 2580000003 HC RX 258: Performed by: INTERNAL MEDICINE

## 2021-03-24 PROCEDURE — 2700000000 HC OXYGEN THERAPY PER DAY

## 2021-03-24 PROCEDURE — 6360000002 HC RX W HCPCS: Performed by: INTERNAL MEDICINE

## 2021-03-24 PROCEDURE — 2060000000 HC ICU INTERMEDIATE R&B

## 2021-03-24 PROCEDURE — 84145 PROCALCITONIN (PCT): CPT

## 2021-03-24 PROCEDURE — 82962 GLUCOSE BLOOD TEST: CPT

## 2021-03-24 PROCEDURE — 51702 INSERT TEMP BLADDER CATH: CPT

## 2021-03-24 RX ORDER — DEXTROSE MONOHYDRATE 25 G/50ML
12.5 INJECTION, SOLUTION INTRAVENOUS PRN
Status: DISCONTINUED | OUTPATIENT
Start: 2021-03-24 | End: 2021-03-29 | Stop reason: HOSPADM

## 2021-03-24 RX ORDER — HYDRALAZINE HYDROCHLORIDE 20 MG/ML
10 INJECTION INTRAMUSCULAR; INTRAVENOUS EVERY 6 HOURS PRN
Status: DISCONTINUED | OUTPATIENT
Start: 2021-03-24 | End: 2021-03-29 | Stop reason: HOSPADM

## 2021-03-24 RX ORDER — NICOTINE POLACRILEX 4 MG
15 LOZENGE BUCCAL PRN
Status: DISCONTINUED | OUTPATIENT
Start: 2021-03-24 | End: 2021-03-29 | Stop reason: HOSPADM

## 2021-03-24 RX ORDER — AMLODIPINE BESYLATE 10 MG/1
10 TABLET ORAL DAILY
Status: DISCONTINUED | OUTPATIENT
Start: 2021-03-24 | End: 2021-03-29 | Stop reason: HOSPADM

## 2021-03-24 RX ORDER — DEXTROSE MONOHYDRATE 50 MG/ML
100 INJECTION, SOLUTION INTRAVENOUS PRN
Status: DISCONTINUED | OUTPATIENT
Start: 2021-03-24 | End: 2021-03-29 | Stop reason: HOSPADM

## 2021-03-24 RX ADMIN — Medication 2000 UNITS: at 08:29

## 2021-03-24 RX ADMIN — PANTOPRAZOLE SODIUM 40 MG: 40 TABLET, DELAYED RELEASE ORAL at 05:28

## 2021-03-24 RX ADMIN — ATORVASTATIN CALCIUM 10 MG: 10 TABLET, FILM COATED ORAL at 08:30

## 2021-03-24 RX ADMIN — CEFTRIAXONE 1000 MG: 1 INJECTION, POWDER, FOR SOLUTION INTRAMUSCULAR; INTRAVENOUS at 08:28

## 2021-03-24 RX ADMIN — AMLODIPINE BESYLATE 10 MG: 10 TABLET ORAL at 17:03

## 2021-03-24 RX ADMIN — TAMSULOSIN HYDROCHLORIDE 0.4 MG: 0.4 CAPSULE ORAL at 08:30

## 2021-03-24 RX ADMIN — APIXABAN 2.5 MG: 2.5 TABLET, FILM COATED ORAL at 21:37

## 2021-03-24 RX ADMIN — SODIUM CHLORIDE, PRESERVATIVE FREE 10 ML: 5 INJECTION INTRAVENOUS at 21:37

## 2021-03-24 RX ADMIN — LATANOPROST 1 DROP: 50 SOLUTION/ DROPS OPHTHALMIC at 21:37

## 2021-03-24 RX ADMIN — SODIUM CHLORIDE, PRESERVATIVE FREE 10 ML: 5 INJECTION INTRAVENOUS at 08:29

## 2021-03-24 RX ADMIN — APIXABAN 2.5 MG: 2.5 TABLET, FILM COATED ORAL at 08:30

## 2021-03-24 RX ADMIN — DEXAMETHASONE 6 MG: 4 TABLET ORAL at 08:29

## 2021-03-24 RX ADMIN — AZITHROMYCIN MONOHYDRATE 500 MG: 500 INJECTION, POWDER, LYOPHILIZED, FOR SOLUTION INTRAVENOUS at 08:29

## 2021-03-24 RX ADMIN — LEVOTHYROXINE SODIUM 50 MCG: 50 TABLET ORAL at 05:28

## 2021-03-24 RX ADMIN — INSULIN LISPRO 1 UNITS: 100 INJECTION, SOLUTION INTRAVENOUS; SUBCUTANEOUS at 21:40

## 2021-03-24 RX ADMIN — LOSARTAN POTASSIUM 100 MG: 50 TABLET, FILM COATED ORAL at 08:30

## 2021-03-24 NOTE — PROGRESS NOTES
pulmonary      SUBJECTIVE: remains on o2     OBJECTIVE    VITALS:  BP (!) 153/77   Pulse 56   Temp 97.7 °F (36.5 °C) (Oral)   Resp 17   Ht 6' 2\" (1.88 m)   Wt 179 lb 3.7 oz (81.3 kg)   SpO2 99%   BMI 23.01 kg/m²   HEAD AND FACE EXAM:  No throat injection, no active exudate,no thrush  NECK EXAM;No JVD, no masses, symmetrical  CHEST EXAM; Expansion equal and symmetrical, no masses  LUNG EXAM; Good breath sounds bilaterally. There are expiratory wheezes both lungs, there are crackles at both lung bases  CARDIOVASCULAR EXAM: Positive S1 and S2, no S3 or S4, no clicks ,no murmurs  RIGHT AND LEFT LOWER EXTRIMITY EXAM: No edema, no swelling, no inflamation            LABS   Lab Results   Component Value Date    WBC 13.6 (H) 03/23/2021    HGB 13.0 (L) 03/23/2021    HCT 38.0 (L) 03/23/2021    MCV 89.2 03/23/2021     03/23/2021     Lab Results   Component Value Date    CREATININE 1.5 (H) 03/23/2021    BUN 35 (H) 03/23/2021     03/23/2021    K 3.9 03/23/2021     03/23/2021    CO2 22 03/23/2021     Lab Results   Component Value Date    INR 1.03 03/21/2021    PROTIME 12.5 03/21/2021          Lab Results   Component Value Date    PHOS 2.5 12/06/2014      No results for input(s): PH, PO2ART, HWF4ZZA, HCO3, BEART, O2SAT in the last 72 hours. Wt Readings from Last 3 Encounters:   03/24/21 179 lb 3.7 oz (81.3 kg)   11/09/20 193 lb 3.2 oz (87.6 kg)   10/18/20 192 lb (87.1 kg)               ASSESMENT  Ac resp failure  covid pneumonia  brian pneumonia        PLAN  1. cpm  2. Cont o2  3.  cxr in am    3/24/2021  Alexx Patel M.D.

## 2021-03-24 NOTE — CONSULTS
Department of Urology   Consult Note  [facility] 1 2 3 4 5      Date: 3/24/2021   Patient: Inge Interiano   : 1921   DOA: 3/20/2021   MRN: [id number]   ROOM#: -A     Reason for Consult:  Request for urinary catheter placement    Requesting Physician:  Hospitalist    CHIEF COMPLAINT:  COVID and pneumonia    History Obtained From:  Nurse, patient and medical records    HISTORY OF PRESENT ILLNESS:                The patient is a Kay Juan [age] y.o. male with significant past medical history BPH with covid and pneumonia. He was voiding and bladder not distended on CT Scan 3/20/2021. Cr 1.5. Urinary catheter placed this AM and 240ml of urine obtained. Since that time patient complained of pain and leaking/bleeding around the catheter. He was voiding around the catheter- clearer urine. The catheter was removed and he is now comfortable. No visible blood at meatus. On eliquis  He is voiding.   I recommended observing the patient prior to catheter replacement  He is on O2 supplement but appears comfortable    Past Medical History:        Diagnosis Date    Diabetes mellitus ([address]     Hypertension      Past Surgical History:        Procedure Laterality Date    CHOLECYSTECTOMY  14    attempted lap, open    HERNIA REPAIR      PROSTATE SURGERY       Current Medications:   Current Facility-Administered Medications: insulin lispro (HUMALOG) injection vial 0-12 Units, 0-12 Units, Subcutaneous, TID WC  insulin lispro (HUMALOG) injection vial 0-6 Units, 0-6 Units, Subcutaneous, Nightly  glucose (GLUTOSE) 40 % oral gel 15 g, 15 g, Oral, PRN  dextrose 50 % IV solution, 12.5 g, Intravenous, PRN  glucagon (rDNA) injection 1 mg, 1 mg, Intramuscular, PRN  dextrose 5 % solution, 100 mL/hr, Intravenous, PRN  amLODIPine (NORVASC) tablet 10 mg, 10 mg, Oral, Daily  hydrALAZINE (APRESOLINE) injection 10 mg, 10 mg, Intravenous, Q6H PRN  cefTRIAXone (ROCEPHIN) 1000 mg IVPB in 50 mL D5W minibag, 1,000 mg, Intravenous, Q24H  azithromycin (ZITHROMAX) 500 mg in dextrose 5 % 250 mL IVPB, 500 mg, Intravenous, Q24H  apixaban (ELIQUIS) tablet 2.5 mg, 2.5 mg, Oral, BID  pantoprazole (PROTONIX) tablet 40 mg, 40 mg, Oral, QAM AC  vitamin D CAPS 2,000 Units, 2,000 Units, Oral, Daily  sodium chloride flush 0.9 % injection 10 mL, 10 mL, Intravenous, 2 times per day  sodium chloride flush 0.9 % injection 10 mL, 10 mL, Intravenous, PRN  promethazine (PHENERGAN) tablet 12.5 mg, 12.5 mg, Oral, Q6H PRN **OR** ondansetron (ZOFRAN) injection 4 mg, 4 mg, Intravenous, Q6H PRN  polyethylene glycol (GLYCOLAX) packet 17 g, 17 g, Oral, Daily PRN  acetaminophen (TYLENOL) tablet 650 mg, 650 mg, Oral, Q6H PRN **OR** acetaminophen (TYLENOL) suppository 650 mg, 650 mg, Rectal, Q6H PRN  dexamethasone (DECADRON) tablet 6 mg, 6 mg, Oral, Daily  0.9 % sodium chloride infusion, , Intravenous, PRN  [Held by provider] carvedilol (COREG) tablet 25 mg, 25 mg, Oral, BID WC  [Held by provider] furosemide (LASIX) tablet 20 mg, 20 mg, Oral, Daily  losartan (COZAAR) tablet 100 mg, 100 mg, Oral, Daily  levothyroxine (SYNTHROID) tablet 50 mcg, 50 mcg, Oral, Daily  tamsulosin (FLOMAX) capsule 0.4 mg, 0.4 mg, Oral, Daily  atorvastatin (LIPITOR) tablet 10 mg, 10 mg, Oral, Daily  latanoprost (XALATAN) 0.005 % ophthalmic solution 1 drop, 1 drop, Both Eyes, Nightly    Allergies:  Patient has no known allergies. Social History:   TOBACCO:   reports that he has never smoked. He has never used smokeless tobacco.  ETOH:   reports no history of alcohol use. DRUGS:   reports no history of drug use. MARITAL STATUS:    OCCUPATION:      Family History:     History reviewed. No pertinent family history. REVIEW OF SYSTEMS:        PHYSICAL EXAM:    VITALS:  BP (!) 197/100   Pulse 86   Temp 97.9 °F (36.6 °C) (Oral)   Resp (!) 31   Ht 6' 2\" (1.88 m)   Wt 179 lb 3.7 oz (81.3 kg)   SpO2 95%   BMI 23.01 kg/m²     TEMPERATURE:  Current - Temp: 97.9 °F (36.6 °C);  Max - Temp  Av.7 °F (36.5 °C)  Min: 97.3 °F (36.3 °C)  Max: 97.9 °F (36.6 °C)  24HR BLOOD PRESSURE RANGE:  Systolic (38ADB), EUX:210 , Min:148 , PTF:861   ; Diastolic (06RIO), ZUT:11, Min:70, Max:108    8HR INTAKE OUTPUT:  No intake/output data recorded. URINARY CATHETER OUTPUT (Giang):     DRAIN/TUBE OUTPUT:        CONSTITUTIONAL:  awake, alert, cooperative, no apparent distress, and appears stated age  Soft, nd/nt  circ phallus  No bleeding at meatus  No blood around genitals    Data:    WBC:    Lab Results   Component Value Date    WBC 13.6 03/23/2021     Hemoglobin/Hematocrit:    Lab Results   Component Value Date    HGB 13.0 03/23/2021    HCT 38.0 03/23/2021     BMP:    Lab Results   Component Value Date     03/23/2021    K 3.9 03/23/2021     03/23/2021    CO2 22 03/23/2021    BUN 35 03/23/2021    LABALBU 3.4 03/21/2021    CREATININE 1.5 03/23/2021    CALCIUM 8.3 03/23/2021    GFRAA 52 03/23/2021    LABGLOM 43 03/23/2021     PT/INR:    Lab Results   Component Value Date    PROTIME 12.5 03/21/2021    PROTIME 12.8 03/28/2012    INR 1.03 03/21/2021         Imaging: [unfilled]    CT Scan a/p 3/20/2021 reviewd      Impression:  Covid 19 and pneumonia                         Catheter trauma                          He was voiding      Recommendation:   1. Observe patient for voiding. He was voiding prior to catheter placement  2. Will follow      Patient seen and examined, chart reviewed.      Electronically signed by Carolina Guzman MD on 3/24/2021 at 6:52 PM

## 2021-03-24 NOTE — CARE COORDINATION
Reviewed chart for continued discharge planning. Pt is not medically ready for discharge yet but will likely need SNF. Only local facility that is currently accepting Covid pts is [facility] and they are out of network. [facility] be opening their Covid unit. TC to dghiren Jones, updated on current plan and she is agreeable to referral to [facility] for possible plcmt. Advised pt not medically ready but trying to arrange possible plans, she expressed understanding. Referral made to [facility], will depend on pt's progress currently on 30L of 02 and if [facility] will have available bed.

## 2021-03-24 NOTE — PROGRESS NOTES
Hospitalist Progress Note      Name:  Claudell Neve /Age/Sex: 1921  (Kay Juan [age] y.o. male)   MRN & CSN:  [id number] & [id number] Admission Date/Time: 3/20/2021  3:33 PM   Location:  -A PCP: Paul Velasquez MD         Hospital Day: 5    Assessment and Plan:   Claudell Neve is a Kay Juan [age] y.o.  male  who presents with SOB     1) Acute Hypoxic Respiratory failure due to COVID-19 and possible gram positive pneumonia  -CTA chest with multifocal airspace disease. No PE  -COVID-19 positive on 3/20/2021.  -Follow MRSA, Strep and legionella Ag  -CRP and Procal trending down  -S/P CP; not a candidate for remdesivir due to kidney function  -Continue IV Rocephin and Azithro  -Continue decadron for 10 days  -Wean off O2 as tolerated     2) Asymptomatic bradycardia  -Avoid beta-blocker and calcium channel blockers  -2D echo essential normal; no evidence of pericardial effusion     3) Distal Esophageal wall thickening  -Noted on CT abdomen.  Likely GERD related.    -Evaluated by GI.  Plan for outpatient EGD in 2 weeks after discharge.  -Protonix 40 p.o. daily     Other Chronic medical condition; medication resumed unless contraindicated     Essential Hypertension     Glaucoma     CKD III     BPH/enlarged prostate    PT/OT when O2 requirement improves  Diet DIET CARB CONTROL; Dietary Nutrition Supplements: Low Calorie High Protein Supplement   DVT Prophylaxis [] Lovenox, []  Heparin, [] SCDs, [] Ambulation   GI Prophylaxis [] PPI,  [] H2 Blocker,  [] Carafate,  [] Diet/Tube Feeds   Code Status Full Code   Disposition TBD   MDM      History of Present Illness:     Patient was seen and examined  No acute event overnight  Denied any worsening SOB      Ten point ROS reviewed negative, unless as noted above    Objective:        Intake/Output Summary (Last 24 hours) at 3/24/2021 1329  Last data filed at 3/23/2021 2102  Gross per 24 hour   Intake 10 ml   Output --   Net 10 ml      Vitals:   Vitals:    21 1225   BP: Pulse:    Resp:    Temp:    SpO2: 97%     Physical Exam:   GEN Awake male, laying in bed in no apparent distress. Appears given age. EYES Pupils are equally round. No scleral erythema, discharge, or conjunctivitis. HENT Mucous membranes are moist. Oral pharynx without exudates, no evidence of thrush. NECK Supple, no apparent thyromegaly or masses. RESP Clear to auscultation, no wheezes, rales or rhonchi. Symmetric chest movement while on vapotherm. CARDIO/VASC S1/S2 auscultated. Regular rate without appreciable murmurs, rubs, or gallops. No JVD or carotid bruits. Peripheral pulses equal bilaterally and palpable. No peripheral edema. GI Abdomen is soft without significant tenderness, masses, or guarding. Bowel sounds are normoactive. Rectal exam deferred.  No costovertebral angle tenderness. Giang catheter is not present. HEME/LYMPH No palpable cervical lymphadenopathy and no hepatosplenomegaly. No petechiae or ecchymoses. MSK No gross joint deformities. SKIN Normal coloration, warm, dry. NEURO Cranial nerves appear grossly intact, normal speech, no lateralizing weakness. PSYCH Awake, alert. Affect appropriate.     Medications:   Medications:    insulin lispro  0-12 Units Subcutaneous TID     insulin lispro  0-6 Units Subcutaneous Nightly    cefTRIAXone (ROCEPHIN) IV  1,000 mg Intravenous Q24H    azithromycin  500 mg Intravenous Q24H    apixaban  2.5 mg Oral BID    pantoprazole  40 mg Oral QAM AC    vitamin D  2,000 Units Oral Daily    sodium chloride flush  10 mL Intravenous 2 times per day    dexamethasone  6 mg Oral Daily    [Held by provider] carvedilol  25 mg Oral BID     [Held by provider] furosemide  20 mg Oral Daily    losartan  100 mg Oral Daily    levothyroxine  50 mcg Oral Daily    tamsulosin  0.4 mg Oral Daily    atorvastatin  10 mg Oral Daily    latanoprost  1 drop Both Eyes Nightly      Infusions:    dextrose      sodium chloride       PRN Meds: glucose, 15 g, PRN  dextrose, 12.5 g, PRN  glucagon (rDNA), 1 mg, PRN  dextrose, 100 mL/hr, PRN  sodium chloride flush, 10 mL, PRN  promethazine, 12.5 mg, Q6H PRN    Or  ondansetron, 4 mg, Q6H PRN  polyethylene glycol, 17 g, Daily PRN  acetaminophen, 650 mg, Q6H PRN    Or  acetaminophen, 650 mg, Q6H PRN  sodium chloride, , PRN          Electronically signed by Kathia Parks MD on 3/24/2021 at 1:29 PM

## 2021-03-25 ENCOUNTER — APPOINTMENT (OUTPATIENT)
Dept: GENERAL RADIOLOGY | Age: 86
DRG: 177 | End: 2021-03-25
Payer: COMMERCIAL

## 2021-03-25 LAB
ANION GAP SERPL CALCULATED.3IONS-SCNC: 11 MMOL/L (ref 4–16)
BUN BLDV-MCNC: 25 MG/DL (ref 6–23)
CALCIUM SERPL-MCNC: 8.3 MG/DL (ref 8.3–10.6)
CHLORIDE BLD-SCNC: 102 MMOL/L (ref 99–110)
CO2: 23 MMOL/L (ref 21–32)
CREAT SERPL-MCNC: 1.3 MG/DL (ref 0.9–1.3)
CULTURE: NORMAL
CULTURE: NORMAL
GFR AFRICAN AMERICAN: >60 ML/MIN/1.73M2
GFR NON-AFRICAN AMERICAN: 51 ML/MIN/1.73M2
GLUCOSE BLD-MCNC: 203 MG/DL (ref 70–99)
GLUCOSE BLD-MCNC: 206 MG/DL (ref 70–99)
GLUCOSE BLD-MCNC: 247 MG/DL (ref 70–99)
GLUCOSE BLD-MCNC: 261 MG/DL (ref 70–99)
GLUCOSE BLD-MCNC: 282 MG/DL (ref 70–99)
HCT VFR BLD CALC: 38.6 % (ref 42–52)
HEMOGLOBIN: 12.7 GM/DL (ref 13.5–18)
HIGH SENSITIVE C-REACTIVE PROTEIN: 93.1 MG/L
Lab: NORMAL
Lab: NORMAL
MCH RBC QN AUTO: 30 PG (ref 27–31)
MCHC RBC AUTO-ENTMCNC: 32.9 % (ref 32–36)
MCV RBC AUTO: 91 FL (ref 78–100)
PDW BLD-RTO: 13.9 % (ref 11.7–14.9)
PLATELET # BLD: 188 K/CU MM (ref 140–440)
PMV BLD AUTO: 10 FL (ref 7.5–11.1)
POTASSIUM SERPL-SCNC: 3.8 MMOL/L (ref 3.5–5.1)
RBC # BLD: 4.24 M/CU MM (ref 4.6–6.2)
SODIUM BLD-SCNC: 136 MMOL/L (ref 135–145)
SPECIMEN: NORMAL
SPECIMEN: NORMAL
WBC # BLD: 7.4 K/CU MM (ref 4–10.5)

## 2021-03-25 PROCEDURE — 6360000002 HC RX W HCPCS: Performed by: INTERNAL MEDICINE

## 2021-03-25 PROCEDURE — 2060000000 HC ICU INTERMEDIATE R&B

## 2021-03-25 PROCEDURE — 94761 N-INVAS EAR/PLS OXIMETRY MLT: CPT

## 2021-03-25 PROCEDURE — 2700000000 HC OXYGEN THERAPY PER DAY

## 2021-03-25 PROCEDURE — 6370000000 HC RX 637 (ALT 250 FOR IP): Performed by: INTERNAL MEDICINE

## 2021-03-25 PROCEDURE — 71045 X-RAY EXAM CHEST 1 VIEW: CPT

## 2021-03-25 PROCEDURE — 82962 GLUCOSE BLOOD TEST: CPT

## 2021-03-25 PROCEDURE — 2580000003 HC RX 258: Performed by: INTERNAL MEDICINE

## 2021-03-25 PROCEDURE — 80048 BASIC METABOLIC PNL TOTAL CA: CPT

## 2021-03-25 PROCEDURE — 86141 C-REACTIVE PROTEIN HS: CPT

## 2021-03-25 PROCEDURE — 85027 COMPLETE CBC AUTOMATED: CPT

## 2021-03-25 PROCEDURE — 92610 EVALUATE SWALLOWING FUNCTION: CPT

## 2021-03-25 RX ADMIN — LOSARTAN POTASSIUM 100 MG: 50 TABLET, FILM COATED ORAL at 08:08

## 2021-03-25 RX ADMIN — NYSTATIN 500000 UNITS: 100000 SUSPENSION ORAL at 17:23

## 2021-03-25 RX ADMIN — TAMSULOSIN HYDROCHLORIDE 0.4 MG: 0.4 CAPSULE ORAL at 08:08

## 2021-03-25 RX ADMIN — CEFTRIAXONE 1000 MG: 1 INJECTION, POWDER, FOR SOLUTION INTRAMUSCULAR; INTRAVENOUS at 08:09

## 2021-03-25 RX ADMIN — AMLODIPINE BESYLATE 10 MG: 10 TABLET ORAL at 08:08

## 2021-03-25 RX ADMIN — APIXABAN 2.5 MG: 2.5 TABLET, FILM COATED ORAL at 08:08

## 2021-03-25 RX ADMIN — Medication 2000 UNITS: at 08:08

## 2021-03-25 RX ADMIN — AZITHROMYCIN MONOHYDRATE 500 MG: 500 INJECTION, POWDER, LYOPHILIZED, FOR SOLUTION INTRAVENOUS at 08:57

## 2021-03-25 RX ADMIN — LATANOPROST 1 DROP: 50 SOLUTION/ DROPS OPHTHALMIC at 22:05

## 2021-03-25 RX ADMIN — ATORVASTATIN CALCIUM 10 MG: 10 TABLET, FILM COATED ORAL at 08:08

## 2021-03-25 RX ADMIN — DEXAMETHASONE 6 MG: 4 TABLET ORAL at 08:08

## 2021-03-25 RX ADMIN — HYDRALAZINE HYDROCHLORIDE 10 MG: 20 INJECTION INTRAMUSCULAR; INTRAVENOUS at 04:50

## 2021-03-25 RX ADMIN — LEVOTHYROXINE SODIUM 50 MCG: 50 TABLET ORAL at 05:59

## 2021-03-25 RX ADMIN — INSULIN LISPRO 3 UNITS: 100 INJECTION, SOLUTION INTRAVENOUS; SUBCUTANEOUS at 22:03

## 2021-03-25 RX ADMIN — NYSTATIN 500000 UNITS: 100000 SUSPENSION ORAL at 22:01

## 2021-03-25 RX ADMIN — SODIUM CHLORIDE, PRESERVATIVE FREE 10 ML: 5 INJECTION INTRAVENOUS at 08:08

## 2021-03-25 RX ADMIN — APIXABAN 2.5 MG: 2.5 TABLET, FILM COATED ORAL at 22:01

## 2021-03-25 RX ADMIN — SODIUM CHLORIDE, PRESERVATIVE FREE 10 ML: 5 INJECTION INTRAVENOUS at 22:05

## 2021-03-25 RX ADMIN — PANTOPRAZOLE SODIUM 40 MG: 40 TABLET, DELAYED RELEASE ORAL at 05:59

## 2021-03-25 RX ADMIN — NYSTATIN 500000 UNITS: 100000 SUSPENSION ORAL at 12:31

## 2021-03-25 ASSESSMENT — PAIN SCALES - GENERAL: PAINLEVEL_OUTOF10: 0

## 2021-03-25 NOTE — PROGRESS NOTES
[facility] [address]   Progress Note  [facility] 0 1 2      Date: 3/25/2021   Patient: Jr Sun   : 1921   DOA: 3/20/2021   MRN: [id number]   ROOM#: -A     Admit Date: 3/20/2021     Collaborating Urologist on Call at time of admission: Dr. Bustos Iza: COVID and pneumonia  Reason for Consult:  Request for urinary catheter placement    Subjective:     Pain: none, no nausea and no vomiting,   Bowel Movement/Flatus:   Yes  Voiding:  easily,     Pt resting comfortably in bed, denies any pain. States he has been able to urinate since truong removal yesterday.     Objective:    Vitals:    BP (!) 176/87   Pulse 61   Temp 98.4 °F (36.9 °C) (Oral)   Resp 30   Ht 6' 2\" (1.88 m)   Wt 179 lb 14.3 oz (81.6 kg)   SpO2 97%   BMI 23.10 kg/m²    Temp  Av.8 °F (36.6 °C)  Min: 97.2 °F (36.2 °C)  Max: 98.4 °F (36.9 °C)       Intake/Output Summary (Last 24 hours) at 3/25/2021 0805  Last data filed at 3/24/2021 2137  Gross per 24 hour   Intake 10 ml   Output --   Net 10 ml       Physical Exam:   General appearance: alert, appears stated age, cooperative and no distress  Head: Normocephalic, without obvious abnormality, atraumatic  Back: No CVA tenderness  Abdomen: Soft, non-tender, non-distended   : Circumcised phallus with no bleeding noted per urethra    Labs:   WBC:    Lab Results   Component Value Date    WBC 7.4 2021      Hemoglobin/Hematocrit:    Lab Results   Component Value Date    HGB 12.7 2021    HCT 38.6 2021      BMP:   Lab Results   Component Value Date     2021    K 3.8 2021     2021    CO2 23 2021    BUN 25 2021    LABALBU 3.4 2021    CREATININE 1.3 2021    CALCIUM 8.3 2021    GFRAA >60 2021    LABGLOM 51 2021      PT/INR:    Lab Results   Component Value Date    PROTIME 12.5 2021    PROTIME 12.8 2012    INR 1.03 2021      PTT:    Lab Results   Component Value Date    APTT 41.8 03/21/2021     Blood Culture: NO GROWTH AT 4 DAYS     Imaging:  Echocardiogram Limited    Result Date: 3/22/2021  Transthoracic Echocardiography Report (TTE)  Demographics   Patient Name       Ming Evans    Date of Study       03/22/2021   Date of Birth      02/02/1921        Gender              Male   Age                80 year(s)       Race                Black   Patient Number     [id number]        Room Number         2019   Visit Number       [id number]   Corporate ID       [id number]   Accession Number   [id number]        Sheryle Killian, [address] [place] [address]   Ordering Physician Nyle Boxer MD Interpreting        Mando Patrick MD                                       Physician  Procedure Type of Study   TTE procedure:ECHOCARDIOGRAM LIMITED. Procedure Date Date: 03/22/2021 Start: 10:58 AM Study Location: Portable Technical Quality: Adequate visualization Indications:Dyspnea/SOB. Patient Status: Routine Height: 74 inches Weight: 188 pounds BSA: 2.12 m2 BMI: 24.14 kg/m2 HR: 60 bpm BP: 147/70 mmHg  Conclusions   Summary  Expedited imaging was used to obtain protocol images to reduce the  sonographer's exposure during COVID-19 pandemic. Left ventricular systolic function is normal.  Ejection fraction is visually estimated at 60%. Mild left ventricular hypertrophy. Mild aortic stenosis. Mild mitral annular calcification is present. No evidence of any pericardial effusion. Signature   ------------------------------------------------------------------  Electronically signed by Mando Patrick MD (Interpreting  physician) on 03/22/2021 at 02:05 PM  ------------------------------------------------------------------   Findings   Left Ventricle  Left ventricular systolic function is normal.  Ejection fraction is visually estimated at 60%. Mild left ventricular hypertrophy. Left Atrium  Mildly dilated left atrium.    Right Atrium  Essentially normal right atrium. Right Ventricle  Essentially normal right ventricle. Aortic Valve  Mild aortic stenosis. Mitral Valve  Mild mitral annular calcification is present. Trace mitral regurgitation is present. Tricuspid Valve  Trace tricuspid regurgitation is present. Pulmonic Valve  Mild pulmonic regurgitation present. Pericardial Effusion  No evidence of any pericardial effusion. M-Mode/2D Measurements & Calculations   LV Diastolic Dimension:  LV Systolic Dimension:   AV Cusp Separation: 0.8 cm  4.23 cm                  3.13 cm  LV FS:26 %               LV Volume Diastolic:  LV PW Diastolic: 9.77 cm 32.8 ml  Septum Diastolic: 5.94   LV Volume Systolic: 43.2  cm                       ml  CO: 6.29 l/min           LV EDV/LV EDV Index:  CI: 2.97 l/m*m2          75.7 ml/36 m2LV ESV/LV                           ESV Index: 30.7 ml/14 m2                           EF Calculated (A4C):                           59.5 %                           EF Calculated (2D): 51.4                           %                            LVOT: 2.4 cm  Doppler Measurements & Calculations    AV Peak Velocity: 269 cm/s    LVOT Peak Velocity: 91 cm/s   AV Peak Gradient: 28.94 mmHg  LVOT Mean Velocity: 60.1 cm/s   AV Mean Velocity: 177 cm/s    LVOT Peak Gradient: 3 mmHgLVOT Mean Gradient:   AV Mean Gradient: 14 mmHg     2 mmHg   AV VTI: 59.9 cm   AV Area (Continuity):1.75 cm2    LVOT VTI: 23.2 cm      Ct Head Wo Contrast    Result Date: 3/20/2021  EXAMINATION: CT OF THE HEAD WITHOUT CONTRAST  3/20/2021 7:03 pm TECHNIQUE: CT of the head was performed without the administration of intravenous contrast. Dose modulation, iterative reconstruction, and/or weight based adjustment of the mA/kV was utilized to reduce the radiation dose to as low as reasonably achievable.  COMPARISON: 03/09/2020 HISTORY: ORDERING SYSTEM PROVIDED HISTORY: weakness TECHNOLOGIST PROVIDED HISTORY: Has a \"code stroke\" or \"stroke alert\" been related to reflux esophagitis or underlying neoplastic process. Organs: Right renal cyst.  Kidneys, spleen, adrenal glands, pancreas otherwise unremarkable. Previous cholecystectomy. Otherwise liver unremarkable. GI/Bowel: Colonic diverticulosis. No bowel obstruction. Appendix not identified. No Pelvis: Enlarged prostate. Bladder is not well distended. Peritoneum/Retroperitoneum: No free air or free fluid. Aortic vascular calcifications. Bones/Soft Tissues: Degenerate changes lower spine. Esophageal wall mural thickening could be related to underdistention, esophagitis, or reflux. Neoplasm may also be considered in differential although thought less likely. This associated with adjacent nodules. If warranted, esophagram or upper GI may be considered if this may change patient management. Colonic diverticulosis. Enlarged prostate. Xr Chest Portable    Result Date: 3/25/2021  EXAMINATION: ONE XRAY VIEW OF THE CHEST 3/25/2021 5:04 am COMPARISON: Chest x-ray 03/20/2021 HISTORY: ORDERING SYSTEM PROVIDED HISTORY: fu pneumonia TECHNOLOGIST PROVIDED HISTORY: Reason for exam:->fu pneumonia Reason for Exam: fu pneumonia Acuity: Acute Type of Exam: Subsequent/Follow-up FINDINGS: There is improved aeration of the lungs compared to the chest x-ray 03/20/2021 with some mild residual scattered opacities. Mildly increased interstitial markings are present. Costophrenic angles are clear. Cardiac and mediastinal structures are unchanged. Overall improved aeration of the lungs since the chest x-ray 03/20/2021 with some residual opacity and suggestion of interstitial edema. Xr Chest Portable    Result Date: 3/21/2021  EXAMINATION: ONE XRAY VIEW OF THE CHEST 3/20/2021 4:34 pm COMPARISON: Chest radiograph performed October 18, 2020.  HISTORY: ORDERING SYSTEM PROVIDED HISTORY: weakness, fatigue TECHNOLOGIST PROVIDED HISTORY: Reason for exam:->weakness, fatigue Reason for Exam: weakness, fatigue Acuity: Acute Type of Exam: Initial FINDINGS: Patchy bilateral airspace opacities. No definite pleural effusion or pneumothorax. Stable cardiomediastinal silhouette. No acute osseous abnormality. Patchy bilateral airspace opacities which can be seen with multifocal pneumonia or pulmonary edema. Cta Pulmonary W Contrast    Result Date: 3/20/2021  EXAMINATION: CTA OF THE CHEST 3/20/2021 7:04 pm TECHNIQUE: CTA of the chest was performed after the administration of intravenous contrast.  Multiplanar reformatted images are provided for review. MIP images are provided for review. Dose modulation, iterative reconstruction, and/or weight based adjustment of the mA/kV was utilized to reduce the radiation dose to as low as reasonably achievable. COMPARISON: Chest x-ray 10/18/2020 HISTORY: ORDERING SYSTEM PROVIDED HISTORY: new onset CHF TECHNOLOGIST PROVIDED HISTORY: Reason for exam:->new onset CHF Decision Support Exception->Emergency Medical Condition (MA) Reason for Exam: new onset of CHF Acuity: Unknown Type of Exam: Unknown FINDINGS: Pulmonary Arteries: Main pulmonary artery is enlarged 3.8 cm suggesting pulmonary arterial hypertension. Probable calcified granuloma involving 1 of the right lower lobe vessels is unclear is within the lumen versus adjacent to 1 of the right lower lobe vessels, however there is satisfactory opacification distal to this calcific density, this is doubtful clinical significance. . Mediastinum: Cardiomegaly. Pericardial effusion. Coronary calcifications. Aortic vascular calcifications. Esophageal wall mural thickening few adjacent subcentimeter lymph nodes. This could be related to esophagitis. Lungs/pleura: Multifocal interstitial alveolar opacities both lungs may represent multifocal pulmonary edema multifocal atypical pneumonia or typical pneumonia. Upper Abdomen: CT abdomen pelvis Soft Tissues/Bones: Multilevel abdomen degenerate changes. Multilevel ankylosis.      No central to segmental pulmonary embolism. Multifocal airspace disease. Findings may be due to interstitial alveolar edema or typical or atypical pneumonia. Cardiomegaly coronary artery disease. Pericardial effusion. Distal esophageal wall mural thickening please see CT of abdomen/pelvis differential.       Assessment & Plan:      Josep Hernandez is a 8 y.o. year old male admitted 3/20/2021 for COVID-19.    1) Giang Catheter Trauma   Pt voiding prior to catheter placement; only 240cc urine return after catheter placement yesterday am.   Pt incontinent; recommend avoiding catheter placement if possible as long as pt able to void. Cr 1.3, improved from 1.7 3/20/21   Recommend obtaining PVR bladder scan; please notify us if >250cc. Pt stable from a  standpoint. Will sign off, please call with any questions. Pt to follow up with us as needed. Patient seen and examined, chart reviewed.      Electronically signed by Erin Valladares PA-C on 3/25/2021 at 8:05 AM

## 2021-03-25 NOTE — PROGRESS NOTES
Speech Language Pathology  Facility/Department: [facility] ICU STEPDOWN   CLINICAL BEDSIDE SWALLOW EVALUATION    NAME: Nieves Severance  : 1921  MRN: [id number]    ADMISSION DATE: 3/20/2021  ADMITTING DIAGNOSIS: has Acute cholecystitis; Essential hypertension, benign; Type 2 diabetes mellitus without complication, without long-term current use of insulin ([address] Weakness; Prostate cancer ([facility] Chronic diastolic congestive heart failure ([facility] [address] and COVID-19 on their problem list.      Impressions: Nieves Severance was seen for a bedside swallowing evaluation after being admitted to [facility] with SOB and fatigue. Pt was diagnosed with COVID-19. He was lethargic and cooperative throughout assessment. Relevant medical hx includes hypertension and CHF. Nursing reports pt was having difficulty with breakfast tray this AM.  Pt was positioned upright in bed and presented with a clear vocal quality and strong volitional cough. Oral mechanism examination was [facility] with no focal orofacial weakness observed. PO trials of puree, soft/regular solids and thin liquids by cup/straw sips were given. Mild oral dysphagia was observed characterized by prolonged mastication and slow oral A-P transit with trials of regular solids. Suspect mild pharyngeal dysphagia characterized by intermittently delayed swallow initiation with adequate laryngeal elevation. Clear vocal quality and 0 overt s/s of aspiration were observed with all PO trials given. Recommend dysphagia 3 diet/thin liquids with strict aspiration precautions. Only feed pt when he is alert. ST will continue to follow Nieves Severance for diet tolerance monitoring x1-2.        ONSET DATE: this admission     Date of Eval: 3/25/2021  Evaluating Therapist: Bhaskar Argueta    Current Diet level:  Current Diet : Regular  Current Liquid Diet : Thin      Primary Complaint       Pain:  Pain Assessment  Pain Assessment: 0-10  Pain Level: 0  Patient's Stated Pain Goal: No pain    Reason Limits  Hearing  Hearing: Within functional limits    Oral Motor Deficits  Oral/Motor  Oral Motor: Within functional limits    Oral Phase Dysfunction  Oral Phase  Oral Phase: Exceptions     Indicators of Pharyngeal Phase Dysfunction   Pharyngeal Phase  Pharyngeal Phase: Exceptions    Prognosis  Prognosis  Prognosis for safe diet advancement: good  Barriers to reach goals: fatigue  Individuals consulted  Consulted and agree with results and recommendations: Patient;RN    Education  Patient Education: recommendations/POC  Patient Education Response: Verbalizes understanding  Safety Devices in place: Yes  Type of devices:  All fall risk precautions in place       Therapy Time  SLP Individual Minutes  Time In: 9922  Time Out: 100[address] [facility]  Minutes: [address], [address], [facility]-SLP, 3/25/2021

## 2021-03-25 NOTE — PROGRESS NOTES
pulmonary      SUBJECTIVE: remains on o2     OBJECTIVE    VITALS:  BP (!) 157/82   Pulse 73   Temp 98.1 °F (36.7 °C) (Oral)   Resp 12   Ht 6' 2\" (1.88 m)   Wt 179 lb 14.3 oz (81.6 kg)   SpO2 95%   BMI 23.10 kg/m²   HEAD AND FACE EXAM:  No throat injection, no active exudate,no thrush  NECK EXAM;No JVD, no masses, symmetrical  CHEST EXAM; Expansion equal and symmetrical, no masses  LUNG EXAM; Good breath sounds bilaterally. There are expiratory wheezes both lungs, there are crackles at both lung bases  CARDIOVASCULAR EXAM: Positive S1 and S2, no S3 or S4, no clicks ,no murmurs  RIGHT AND LEFT LOWER EXTRIMITY EXAM: No edema, no swelling, no inflamation            LABS   Lab Results   Component Value Date    WBC 7.4 03/25/2021    HGB 12.7 (L) 03/25/2021    HCT 38.6 (L) 03/25/2021    MCV 91.0 03/25/2021     03/25/2021     Lab Results   Component Value Date    CREATININE 1.3 03/25/2021    BUN 25 (H) 03/25/2021     03/25/2021    K 3.8 03/25/2021     03/25/2021    CO2 23 03/25/2021     Lab Results   Component Value Date    INR 1.03 03/21/2021    PROTIME 12.5 03/21/2021          Lab Results   Component Value Date    PHOS 2.5 12/06/2014      No results for input(s): PH, PO2ART, WQN0HRZ, HCO3, BEART, O2SAT in the last 72 hours. Wt Readings from Last 3 Encounters:   03/25/21 179 lb 14.3 oz (81.6 kg)   11/09/20 193 lb 3.2 oz (87.6 kg)   10/18/20 192 lb (87.1 kg)               ASSESMENT  Ac resp failure  covid pneumonia  brian pneumonia        PLAN  1. cpm  2. Cont o2  3.  Agree with possible transfer to [facility]    3/25/2021  Ramona Bran M.D.

## 2021-03-25 NOTE — PROGRESS NOTES
Patient's respiratory support weaned from Airvo to 15L High Flow cannula off the wall.     Patient displays no respiratory distress currently, SpO2 97%. Breath sounds clear/diminished.      Will continue to monitor.

## 2021-03-25 NOTE — PROGRESS NOTES
Hospitalist Progress Note      Name:  Helen Pandey /Age/Sex: 1921  (Kay  [age] y.o. male)   MRN & CSN:  [id number] & [id number] Admission Date/Time: 3/20/2021  3:33 PM   Location:  -A PCP: Romeo Pitts MD         Hospital Day: 6    Assessment and Plan:   Fozia Crandall a 89 y. o.  male  who presents with SOB     1) Acute Hypoxic Respiratory failure due to COVID-19 and possible gram positive pneumonia  -CTA chest with multifocal airspace disease. No PE  -COVID-19 positive on 3/20/2021.  -Follow MRSA, Strep and legionella Ag  -CRP and Procal trending down  -S/P CP; not a candidate for remdesivir due to kidney function  -Continue IV Rocephin and Azithro  -Continue decadron for 10 days  -Wean off O2 as tolerated     2) Asymptomatic bradycardia  -Avoid beta-blocker and calcium channel blockers  -2D echo essential normal; no evidence of pericardial effusion     3) Distal Esophageal wall thickening  -Noted on CT abdomen.  Likely GERD related.    -Evaluated by GI.  Plan for outpatient EGD in 2 weeks after discharge.  -Protonix 40 p.o. daily    4) Truong trauma  -Attempted truong placement yesterday which was discontinued due to discomfort after placement  -Had mild urethral bleeding  -Hold off further truong replacement  -Urology on board      Other Chronic medical condition; medication resumed unless contraindicated     Essential Hypertension     Glaucoma     CKD III     BPH/enlarged prostate     PT/OT evaluated; recommending SNF    Diet DIET CARB CONTROL;   Dietary Nutrition Supplements: Low Calorie High Protein Supplement   DVT Prophylaxis [] Lovenox, []  Heparin, [] SCDs, [] Ambulation   GI Prophylaxis [] PPI,  [] H2 Blocker,  [] Carafate,  [] Diet/Tube Feeds   Code Status Full Code   Disposition SNF   [facility]      History of Present Illness:     Patient was seen and examined  Denied any chest pain or worsening shortness of breath  No dizziness or palpitation      Ten point ROS reviewed negative, unless as noted above    Objective: Intake/Output Summary (Last 24 hours) at 3/25/2021 1135  Last data filed at 3/24/2021 2137  Gross per 24 hour   Intake 10 ml   Output --   Net 10 ml      Vitals:   Vitals:    03/25/21 1003   BP: (!) 157/82   Pulse: 73   Resp: 12   Temp:    SpO2: 95%     Physical Exam:   GEN Awake male,laying in bed in no apparent distress. Appears given age. EYES Pupils are equally round. No scleral erythema, discharge, or conjunctivitis. HENT Mucous membranes are moist. Oral pharynx without exudates, evidence of thrush. NECK Supple, no apparent thyromegaly or masses. RESP Clear to auscultation, no wheezes, rales or rhonchi. Symmetric chest movement while on vapotherm. CARDIO/VASC S1/S2 auscultated. Regular rate without appreciable murmurs, rubs, or gallops. No JVD or carotid bruits. Peripheral pulses equal bilaterally and palpable. No peripheral edema. GI Abdomen is soft without significant tenderness, masses, or guarding. Bowel sounds are normoactive. Rectal exam deferred.  No costovertebral angle tenderness. Giang catheter is not present. HEME/LYMPH No palpable cervical lymphadenopathy and no hepatosplenomegaly. No petechiae or ecchymoses. MSK No gross joint deformities. SKIN Normal coloration, warm, dry. NEURO Cranial nerves appear grossly intact, normal speech, no lateralizing weakness. PSYCH Awake, alert. Affect appropriate.     Medications:   Medications:    nystatin  5 mL Oral 4x Daily    insulin lispro  0-12 Units Subcutaneous TID     insulin lispro  0-6 Units Subcutaneous Nightly    amLODIPine  10 mg Oral Daily    cefTRIAXone (ROCEPHIN) IV  1,000 mg Intravenous Q24H    azithromycin  500 mg Intravenous Q24H    apixaban  2.5 mg Oral BID    pantoprazole  40 mg Oral QAM AC    vitamin D  2,000 Units Oral Daily    sodium chloride flush  10 mL Intravenous 2 times per day    dexamethasone  6 mg Oral Daily    [Held by provider] carvedilol  25 mg Oral BID   [Held by provider] furosemide  20 mg Oral Daily    losartan  100 mg Oral Daily    levothyroxine  50 mcg Oral Daily    tamsulosin  0.4 mg Oral Daily    atorvastatin  10 mg Oral Daily    latanoprost  1 drop Both Eyes Nightly      Infusions:    dextrose      sodium chloride       PRN Meds: glucose, 15 g, PRN  dextrose, 12.5 g, PRN  glucagon (rDNA), 1 mg, PRN  dextrose, 100 mL/hr, PRN  hydrALAZINE, 10 mg, Q6H PRN  sodium chloride flush, 10 mL, PRN  promethazine, 12.5 mg, Q6H PRN    Or  ondansetron, 4 mg, Q6H PRN  polyethylene glycol, 17 g, Daily PRN  acetaminophen, 650 mg, Q6H PRN    Or  acetaminophen, 650 mg, Q6H PRN  sodium chloride, , PRN          Electronically signed by Tanya Bobo MD on 3/25/2021 at 11:35 AM

## 2021-03-25 NOTE — CARE COORDINATION
Spoke with Carolee at [facility] and they are in process of opening covid unit and willing to review for possible admission, sent referral. Pt not medically ready.

## 2021-03-26 LAB
ANION GAP SERPL CALCULATED.3IONS-SCNC: 13 MMOL/L (ref 4–16)
BUN BLDV-MCNC: 26 MG/DL (ref 6–23)
CALCIUM SERPL-MCNC: 8.7 MG/DL (ref 8.3–10.6)
CHLORIDE BLD-SCNC: 100 MMOL/L (ref 99–110)
CO2: 24 MMOL/L (ref 21–32)
CREAT SERPL-MCNC: 1.3 MG/DL (ref 0.9–1.3)
GFR AFRICAN AMERICAN: >60 ML/MIN/1.73M2
GFR NON-AFRICAN AMERICAN: 51 ML/MIN/1.73M2
GLUCOSE BLD-MCNC: 199 MG/DL (ref 70–99)
GLUCOSE BLD-MCNC: 221 MG/DL (ref 70–99)
GLUCOSE BLD-MCNC: 254 MG/DL (ref 70–99)
GLUCOSE BLD-MCNC: 258 MG/DL (ref 70–99)
GLUCOSE BLD-MCNC: 317 MG/DL (ref 70–99)
HCT VFR BLD CALC: 43.5 % (ref 42–52)
HEMOGLOBIN: 13.6 GM/DL (ref 13.5–18)
MCH RBC QN AUTO: 30.8 PG (ref 27–31)
MCHC RBC AUTO-ENTMCNC: 31.3 % (ref 32–36)
MCV RBC AUTO: 98.6 FL (ref 78–100)
PDW BLD-RTO: 13.8 % (ref 11.7–14.9)
PLATELET # BLD: 202 K/CU MM (ref 140–440)
PMV BLD AUTO: 9.9 FL (ref 7.5–11.1)
POTASSIUM SERPL-SCNC: 3.9 MMOL/L (ref 3.5–5.1)
PROCALCITONIN: 0.2
RBC # BLD: 4.41 M/CU MM (ref 4.6–6.2)
SODIUM BLD-SCNC: 137 MMOL/L (ref 135–145)
WBC # BLD: 9.2 K/CU MM (ref 4–10.5)

## 2021-03-26 PROCEDURE — 6370000000 HC RX 637 (ALT 250 FOR IP): Performed by: INTERNAL MEDICINE

## 2021-03-26 PROCEDURE — 92526 ORAL FUNCTION THERAPY: CPT

## 2021-03-26 PROCEDURE — 2060000000 HC ICU INTERMEDIATE R&B

## 2021-03-26 PROCEDURE — 97116 GAIT TRAINING THERAPY: CPT

## 2021-03-26 PROCEDURE — 97530 THERAPEUTIC ACTIVITIES: CPT

## 2021-03-26 PROCEDURE — 6360000002 HC RX W HCPCS: Performed by: INTERNAL MEDICINE

## 2021-03-26 PROCEDURE — 82962 GLUCOSE BLOOD TEST: CPT

## 2021-03-26 PROCEDURE — 2580000003 HC RX 258: Performed by: INTERNAL MEDICINE

## 2021-03-26 PROCEDURE — 84145 PROCALCITONIN (PCT): CPT

## 2021-03-26 PROCEDURE — 97535 SELF CARE MNGMENT TRAINING: CPT

## 2021-03-26 PROCEDURE — 80048 BASIC METABOLIC PNL TOTAL CA: CPT

## 2021-03-26 PROCEDURE — 2700000000 HC OXYGEN THERAPY PER DAY

## 2021-03-26 PROCEDURE — 94761 N-INVAS EAR/PLS OXIMETRY MLT: CPT

## 2021-03-26 PROCEDURE — 85027 COMPLETE CBC AUTOMATED: CPT

## 2021-03-26 RX ADMIN — SODIUM CHLORIDE, PRESERVATIVE FREE 10 ML: 5 INJECTION INTRAVENOUS at 09:49

## 2021-03-26 RX ADMIN — APIXABAN 2.5 MG: 2.5 TABLET, FILM COATED ORAL at 21:24

## 2021-03-26 RX ADMIN — LEVOTHYROXINE SODIUM 50 MCG: 50 TABLET ORAL at 05:58

## 2021-03-26 RX ADMIN — AMLODIPINE BESYLATE 10 MG: 10 TABLET ORAL at 09:46

## 2021-03-26 RX ADMIN — TAMSULOSIN HYDROCHLORIDE 0.4 MG: 0.4 CAPSULE ORAL at 09:46

## 2021-03-26 RX ADMIN — Medication 2000 UNITS: at 09:50

## 2021-03-26 RX ADMIN — ATORVASTATIN CALCIUM 10 MG: 10 TABLET, FILM COATED ORAL at 09:46

## 2021-03-26 RX ADMIN — AZITHROMYCIN MONOHYDRATE 500 MG: 500 INJECTION, POWDER, LYOPHILIZED, FOR SOLUTION INTRAVENOUS at 09:46

## 2021-03-26 RX ADMIN — DEXAMETHASONE 6 MG: 4 TABLET ORAL at 09:46

## 2021-03-26 RX ADMIN — INSULIN LISPRO 3 UNITS: 100 INJECTION, SOLUTION INTRAVENOUS; SUBCUTANEOUS at 21:30

## 2021-03-26 RX ADMIN — CEFTRIAXONE 1000 MG: 1 INJECTION, POWDER, FOR SOLUTION INTRAMUSCULAR; INTRAVENOUS at 09:49

## 2021-03-26 RX ADMIN — APIXABAN 2.5 MG: 2.5 TABLET, FILM COATED ORAL at 09:46

## 2021-03-26 RX ADMIN — NYSTATIN 500000 UNITS: 100000 SUSPENSION ORAL at 21:24

## 2021-03-26 RX ADMIN — PANTOPRAZOLE SODIUM 40 MG: 40 TABLET, DELAYED RELEASE ORAL at 05:59

## 2021-03-26 RX ADMIN — LOSARTAN POTASSIUM 100 MG: 50 TABLET, FILM COATED ORAL at 09:46

## 2021-03-26 RX ADMIN — SODIUM CHLORIDE, PRESERVATIVE FREE 10 ML: 5 INJECTION INTRAVENOUS at 21:24

## 2021-03-26 RX ADMIN — NYSTATIN 500000 UNITS: 100000 SUSPENSION ORAL at 17:35

## 2021-03-26 RX ADMIN — NYSTATIN 500000 UNITS: 100000 SUSPENSION ORAL at 12:16

## 2021-03-26 RX ADMIN — LATANOPROST 1 DROP: 50 SOLUTION/ DROPS OPHTHALMIC at 21:26

## 2021-03-26 RX ADMIN — NYSTATIN 500000 UNITS: 100000 SUSPENSION ORAL at 09:46

## 2021-03-26 ASSESSMENT — PAIN SCALES - GENERAL
PAINLEVEL_OUTOF10: 0

## 2021-03-26 NOTE — DISCHARGE INSTR - COC
Indicated By Review Planned Expiration Resolved Resolved By    COVID-19  03/22/21 03/22/21 Ether Lynn, LPN 29/40/59 77/60/25      Resolved    C-diff Rule Out 03/20/21 03/20/21 03/20/21 C difficile Molecular/PCR (Ordered)   03/23/21 Ether Lynn, LPN    DTIDC-60 Rule Out 03/20/21 03/20/21 03/20/21 COVID-19, Rapid (Ordered)   03/20/21 Rule-Out Test Resulted            Nurse Assessment:  Last Vital Signs: BP (!) 147/92   Pulse 69   Temp 97.6 °F (36.4 °C) (Oral)   Resp 17   Ht 6' 2.02\" (1.88 m)   Wt 180 lb 5.4 oz (81.8 kg)   SpO2 98%   BMI 23.14 kg/m²     Last documented pain score (0-10 scale): Pain Level: 0  Last Weight:   Wt Readings from Last 1 Encounters:   03/26/21 180 lb 5.4 oz (81.8 kg)     Mental Status:  disoriented and alert    IV Access:  - None    Nursing Mobility/ADLs:  Walking   Assisted  Transfer  Assisted  Bathing  Assisted  Dressing  Assisted  Toileting  Assisted  Feeding  Assisted  Med Admin  Assisted  Med Delivery   [address] MED Delivery:[id number]}    Wound Care Documentation and Therapy:        Elimination:  Continence:   · Bowel: {YES / IC:33168}  · Bladder: {YES / IR:39360}  Urinary Catheter: {Urinary Catheter:[id number]}   Colostomy/Ileostomy/Ileal Conduit: {YES / YD:45229}       Date of Last BM: ***    Intake/Output Summary (Last 24 hours) at 3/26/2021 1233  Last data filed at 3/26/2021 1015  Gross per 24 hour   Intake 240 ml   Output --   Net 240 ml     No intake/output data recorded.     Safety Concerns:     [address] Safety Concerns:[id number]}    Impairments/Disabilities:      [address] Impairments/Disabilities:[id number]}      Patient's personal belongings (please select all that are sent with patient):  {CHP DME Belongings:522099106}    RN SIGNATURE:  {Esignature:[id number]}    CASE MANAGEMENT/SOCIAL WORK SECTION    Inpatient Status Date: ***    Readmission Risk Assessment Score:  Readmission Risk              Risk of Unplanned Readmission:        19           Discharging to Facility/ Agency   · Name:   · Address:  · Phone:  · Fax:    Dialysis Facility (if applicable)   · Name:  · Address:  · Dialysis Schedule:  · Phone:  · Fax:    / signature: {Esignature:[id number]}        PHYSICIAN SECTION      Nutrition Therapy:  Current Nutrition Therapy:   - Oral Diet:  Dysphagia 3 advanced    Routes of Feeding: Oral  Liquids: No Restrictions  Daily Fluid Restriction: no  Last Modified Barium Swallow with Video (Video Swallowing Test): not done    Treatments at the Time of Hospital Discharge:   Respiratory Treatments:   Oxygen Therapy:  is not on home oxygen therapy. Ventilator:    - No ventilator support    Rehab Therapies: Physical Therapy and Occupational Therapy  Weight Bearing Status/Restrictions: No weight bearing restirctions  Other Medical Equipment (for information only, NOT a DME order):  walker  Other Treatments:     Prognosis: Good    Condition at Discharge: Stable    Rehab Potential (if transferring to Rehab): Good    Recommended Labs or Other Treatments After Discharge:     Physician Certification: I certify the above information and transfer of Claudell Neve  is necessary for the continuing treatment of the diagnosis listed and that he requires [facility] for less 30 days.      Update Admission H&P: No change in H&P    PHYSICIAN SIGNATURE:  Electronically signed by Mare Sim MD on 3/29/21 at 3:48 PM EDT

## 2021-03-26 NOTE — PROGRESS NOTES
[address] [place] OF SPEECH/LANGUAGE PATHOLOGY  DAILY PROGRESS NOTE  Bell Garduno  3/26/2021  [id number]  Pneumonia due to COVID-19 virus [U07.1, J12.82]  COVID-19 [U07.1]  No Known Allergies      Pt was seen this date for dysphagia treatment. IMPRESSION AND RECOMMENDATIONS:   Bell Garduno was seen for a bedside swallowing treatment and diet tolerance monitoring. Pt was lethargic throughout assessment. He was positioned upright in bed and accepted limited PO trials of puree, soft solids and thin liquids by cup/straw sips. Oral holding, slow A-P transit and moderate lingual residue was observed with trials of soft solids. Pharyngeal swallow appeared delayed with adequate laryngeal elevation. Clear vocal quality and 0 overt s/s of aspiration were observed with all PO trials given. Recommend downgrade diet to minced and moist diet/thin liquids with strict aspiration precautions. ST will follow.      GOALS (current status in bold):  Short-term Goals  Timeframe for Short-term Goals: LOS or until goals are met  Goal 1: Pt will tolerate dysphaiga 3 diet/thin liquids without clinical evidence of aspiration 100%  Goal not met- diet level downgraded to dysphagia 2/thin liquids   Goal 2: Pt/caregivers will demonstrate comprehension of recommendations/POC Goal being met, continue              EDUCATION: recommendations/POC  PAIN RATING (0-10 Scale): denies   Time in/Time out: SLP Individual Minutes  Time In: 1400  Time Out: 1420  Minutes: 20    Visit number: [address], [facility]-SLP, 3/26/2021

## 2021-03-26 NOTE — PROGRESS NOTES
for increased functional independence. Pt will perform UB ADLs with CGA to increase functional independence. Pt will perform LB ADLs with Carlos Enrique to increase functional independence. Pt will perform all aspects of toileting with Carlos Enrique to increase functional independence. Pt will participate in therex/therax c emphasis on strength, activity tolerance,  safety, NICOLETTE tasks.       Safety: Left in chair with all needs in reach. chair alarm applied. Gait belt used for transfer and mobility. Time in:  1450  Time out:  1518  Timed treatment minutes:  15  Total treatment time:  28    Electronically signed by:     ERIK Ceballos/L, [facility]   [id number]   3:49 PM, 3/26/2021      Previously filed values:

## 2021-03-26 NOTE — PROGRESS NOTES
Hospitalist Progress Note      Name:  Kiel Burnett /Age/Sex: 1921  (Kay Juan [age] y.o. male)   MRN & CSN:  [id number] & [id number] Admission Date/Time: 3/20/2021  3:33 PM   Location:  -A PCP: Jamie Correa MD         Hospital Day: 7    Assessment and Plan:   Floyd Chen a 89 y. o.  male  who presents with SOB     1) Acute Hypoxic Respiratory failure due to COVID-19 and possible gram positive pneumonia  -CTA chest with multifocal airspace disease. No PE  -COVID-19 positive on 3/20/2021.  -Follow MRSA, Strep and legionella Ag  -CRP and Procal trending down  -S/P CP; not a candidate for remdesivir due to kidney function  -Continue IV Rocephin and Azithro  -Continue decadron for 10 days  -Wean off O2 as tolerated     2) Asymptomatic bradycardia  -Avoid beta-blocker and calcium channel blockers  -2D echo essential normal; no evidence of pericardial effusion     3) Distal Esophageal wall thickening  -Noted on CT abdomen.  Likely GERD related.    -Evaluated by GI.  Plan for outpatient EGD in 2 weeks after discharge.  -Protonix 40 p.o. daily     4) Truong trauma  -Attempted trunog placement yesterday which was discontinued due to discomfort after placement  -Had mild urethral bleeding  -Hold off further truong replacement  -Urology on board      Other Chronic medical condition; medication resumed unless contraindicated     Essential Hypertension     Glaucoma     CKD III     BPH/enlarged prostate     PT/OT evaluated; recommending SNF    Diet Dietary Nutrition Supplements: Low Calorie High Protein Supplement  DIET CARB CONTROL;  Dysphagia Soft and Bite-Sized   DVT Prophylaxis [] Lovenox, []  Heparin, [] SCDs, [] Ambulation   GI Prophylaxis [] PPI,  [] H2 Blocker,  [] Carafate,  [] Diet/Tube Feeds   Code Status Full Code   Disposition SNF   [facility]      History of Present Illness:     Patient was seen and examined  Denied any worsening SOB  No fever, chills, N/V/D  No dizziness or palpitations  No acute event overnight    Ten point ROS reviewed negative, unless as noted above    Objective:   No intake or output data in the 24 hours ending 03/26/21 1048   Vitals:   Vitals:    03/26/21 0827   BP:    Pulse: 77   Resp:    Temp:    SpO2:      Physical Exam:   GEN Awake male, laying in bed in no apparent distress. Appears given age. EYES Pupils are equally round. No scleral erythema, discharge, or conjunctivitis. HENT Mucous membranes are moist. Oral pharynx without exudates, no evidence of thrush. NECK Supple, no apparent thyromegaly or masses. RESP Clear to auscultation, no wheezes, rales or rhonchi. Symmetric chest movement while on 12L of O2 .  CARDIO/VASC S1/S2 auscultated. Regular rate without appreciable murmurs, rubs, or gallops. No JVD or carotid bruits. Peripheral pulses equal bilaterally and palpable. No peripheral edema. GI Abdomen is soft without significant tenderness, masses, or guarding. Bowel sounds are normoactive. Rectal exam deferred.  No costovertebral angle tenderness. Giang catheter is not present. HEME/LYMPH No palpable cervical lymphadenopathy and no hepatosplenomegaly. No petechiae or ecchymoses. MSK No gross joint deformities. SKIN Normal coloration, warm, dry. NEURO Cranial nerves appear grossly intact, normal speech, no lateralizing weakness. PSYCH Awake, alert. Affect appropriate.     Medications:   Medications:    nystatin  5 mL Oral 4x Daily    insulin lispro  0-12 Units Subcutaneous TID WC    insulin lispro  0-6 Units Subcutaneous Nightly    amLODIPine  10 mg Oral Daily    cefTRIAXone (ROCEPHIN) IV  1,000 mg Intravenous Q24H    azithromycin  500 mg Intravenous Q24H    apixaban  2.5 mg Oral BID    pantoprazole  40 mg Oral QAM AC    vitamin D  2,000 Units Oral Daily    sodium chloride flush  10 mL Intravenous 2 times per day    dexamethasone  6 mg Oral Daily    [Held by provider] carvedilol  25 mg Oral BID WC    [Held by provider] furosemide  20 mg Oral Daily

## 2021-03-26 NOTE — PROGRESS NOTES
Physical Therapy    Physical Therapy Treatment Note  Name: Annie Bryant MRN: [id number] :   1921   Date:  3/26/2021   Admission Date: 3/20/2021 Room:  -A   Restrictions/Precautions:        Fall risk  Communication with other providers:  OT, RN   Subjective:  Patient states:  Agreeable to therapy, \"Let's go baby\"  Pain:   Location, Type, Intensity (0/10 to 10/10):  No c/o  Objective:    Observation:  Supine in bed, on 10L of O2  Treatment, including education/measures:  Sup to sit: min A to initiate, able to perform once initiated, SBA, manages hips and trunk well. Sitting balance: good, able to scoot self. Transfers: STS from bed and chair with RW, CGA. Gait: ambulated x65ft x2 with RW, min cues for decreased AD excursion, tolerated well. Standing balance: at commode and at doorway to adjust RW, CGA. Patient left in chair with chair alarm, call light within reach, RN notified, gait belt used. Assessment / Impression:       Patient's tolerance of treatment:  Tolerated well   Adverse Reaction: None  Significant change in status and impact:  Improved mobility  Barriers to improvement:  Min endurance, strength, balance, Council  Plan for Next Session:    Cont POC. Time in:  1447  Time out:  1510  Timed treatment minutes:  23  Total treatment time:  23    Previously filed items:     Short term goals  Time Frame for Short term goals: 1 week  Short term goal 1: Patient will perform supine to sit mod I. Short term goal 2: Patient will perform STS with CGA and LRAD. Short term goal 3: Patient will ambulate x10ft with CGA and LRAD.        Electronically signed by:    Connor Jonas, PT  3/26/2021, 3:10 PM

## 2021-03-26 NOTE — CARE COORDINATION
Spoke with Carolee at [facility], she states she is reviewing, should be able to admit but pt is not medically ready. TC to pt's dghtr Juliaette Litten and updated on plan, she is agreeable to plcmt at [facility] as that was one of her top two choices for plcmt.

## 2021-03-27 LAB
ANION GAP SERPL CALCULATED.3IONS-SCNC: 10 MMOL/L (ref 4–16)
BUN BLDV-MCNC: 30 MG/DL (ref 6–23)
CALCIUM SERPL-MCNC: 8.7 MG/DL (ref 8.3–10.6)
CHLORIDE BLD-SCNC: 100 MMOL/L (ref 99–110)
CO2: 25 MMOL/L (ref 21–32)
CREAT SERPL-MCNC: 1.1 MG/DL (ref 0.9–1.3)
GFR AFRICAN AMERICAN: >60 ML/MIN/1.73M2
GFR NON-AFRICAN AMERICAN: >60 ML/MIN/1.73M2
GLUCOSE BLD-MCNC: 215 MG/DL (ref 70–99)
GLUCOSE BLD-MCNC: 267 MG/DL (ref 70–99)
GLUCOSE BLD-MCNC: 330 MG/DL (ref 70–99)
GLUCOSE BLD-MCNC: 348 MG/DL (ref 70–99)
GLUCOSE BLD-MCNC: 363 MG/DL (ref 70–99)
HCT VFR BLD CALC: 40.3 % (ref 42–52)
HEMOGLOBIN: 13.7 GM/DL (ref 13.5–18)
HIGH SENSITIVE C-REACTIVE PROTEIN: 25.7 MG/L
MCH RBC QN AUTO: 30.8 PG (ref 27–31)
MCHC RBC AUTO-ENTMCNC: 34 % (ref 32–36)
MCV RBC AUTO: 90.6 FL (ref 78–100)
PDW BLD-RTO: 13.7 % (ref 11.7–14.9)
PLATELET # BLD: 248 K/CU MM (ref 140–440)
PMV BLD AUTO: 9.9 FL (ref 7.5–11.1)
POTASSIUM SERPL-SCNC: 3.7 MMOL/L (ref 3.5–5.1)
RBC # BLD: 4.45 M/CU MM (ref 4.6–6.2)
SODIUM BLD-SCNC: 135 MMOL/L (ref 135–145)
WBC # BLD: 10.7 K/CU MM (ref 4–10.5)

## 2021-03-27 PROCEDURE — 6360000002 HC RX W HCPCS: Performed by: INTERNAL MEDICINE

## 2021-03-27 PROCEDURE — 6370000000 HC RX 637 (ALT 250 FOR IP): Performed by: INTERNAL MEDICINE

## 2021-03-27 PROCEDURE — 85027 COMPLETE CBC AUTOMATED: CPT

## 2021-03-27 PROCEDURE — 82962 GLUCOSE BLOOD TEST: CPT

## 2021-03-27 PROCEDURE — 2580000003 HC RX 258: Performed by: INTERNAL MEDICINE

## 2021-03-27 PROCEDURE — 94761 N-INVAS EAR/PLS OXIMETRY MLT: CPT

## 2021-03-27 PROCEDURE — 2060000000 HC ICU INTERMEDIATE R&B

## 2021-03-27 PROCEDURE — 80048 BASIC METABOLIC PNL TOTAL CA: CPT

## 2021-03-27 PROCEDURE — 2700000000 HC OXYGEN THERAPY PER DAY

## 2021-03-27 PROCEDURE — 86141 C-REACTIVE PROTEIN HS: CPT

## 2021-03-27 RX ADMIN — INSULIN LISPRO 4 UNITS: 100 INJECTION, SOLUTION INTRAVENOUS; SUBCUTANEOUS at 20:57

## 2021-03-27 RX ADMIN — Medication 2000 UNITS: at 09:14

## 2021-03-27 RX ADMIN — LEVOTHYROXINE SODIUM 50 MCG: 50 TABLET ORAL at 06:06

## 2021-03-27 RX ADMIN — ATORVASTATIN CALCIUM 10 MG: 10 TABLET, FILM COATED ORAL at 09:14

## 2021-03-27 RX ADMIN — CEFTRIAXONE 1000 MG: 1 INJECTION, POWDER, FOR SOLUTION INTRAMUSCULAR; INTRAVENOUS at 09:15

## 2021-03-27 RX ADMIN — DEXAMETHASONE 6 MG: 4 TABLET ORAL at 09:14

## 2021-03-27 RX ADMIN — NYSTATIN 500000 UNITS: 100000 SUSPENSION ORAL at 09:24

## 2021-03-27 RX ADMIN — APIXABAN 2.5 MG: 2.5 TABLET, FILM COATED ORAL at 09:15

## 2021-03-27 RX ADMIN — NYSTATIN 500000 UNITS: 100000 SUSPENSION ORAL at 20:54

## 2021-03-27 RX ADMIN — SODIUM CHLORIDE, PRESERVATIVE FREE 10 ML: 5 INJECTION INTRAVENOUS at 20:55

## 2021-03-27 RX ADMIN — AMLODIPINE BESYLATE 10 MG: 10 TABLET ORAL at 09:15

## 2021-03-27 RX ADMIN — SODIUM CHLORIDE, PRESERVATIVE FREE 10 ML: 5 INJECTION INTRAVENOUS at 09:15

## 2021-03-27 RX ADMIN — LATANOPROST 1 DROP: 50 SOLUTION/ DROPS OPHTHALMIC at 20:55

## 2021-03-27 RX ADMIN — APIXABAN 2.5 MG: 2.5 TABLET, FILM COATED ORAL at 20:55

## 2021-03-27 RX ADMIN — LOSARTAN POTASSIUM 100 MG: 50 TABLET, FILM COATED ORAL at 09:15

## 2021-03-27 RX ADMIN — PANTOPRAZOLE SODIUM 40 MG: 40 TABLET, DELAYED RELEASE ORAL at 06:06

## 2021-03-27 RX ADMIN — AZITHROMYCIN MONOHYDRATE 500 MG: 500 INJECTION, POWDER, LYOPHILIZED, FOR SOLUTION INTRAVENOUS at 10:25

## 2021-03-27 RX ADMIN — NYSTATIN 500000 UNITS: 100000 SUSPENSION ORAL at 17:53

## 2021-03-27 RX ADMIN — TAMSULOSIN HYDROCHLORIDE 0.4 MG: 0.4 CAPSULE ORAL at 09:14

## 2021-03-27 ASSESSMENT — PAIN SCALES - GENERAL
PAINLEVEL_OUTOF10: 0

## 2021-03-27 NOTE — PLAN OF CARE
Problem: Falls - Risk of:  Goal: Will remain free from falls  Description: Will remain free from falls  3/27/2021 0904 by Toby Prieto RN  Outcome: Ongoing  3/26/2021 2008 by Erin Drew RN  Outcome: Ongoing  Goal: Absence of physical injury  Description: Absence of physical injury  3/27/2021 0904 by Toby Prieto RN  Outcome: Ongoing  3/26/2021 2008 by Erin Drew RN  Outcome: Ongoing     Problem: Airway Clearance - Ineffective  Goal: Achieve or maintain patent airway  3/27/2021 0904 by Toby Prieto RN  Outcome: Ongoing  3/26/2021 2008 by Erin Drew RN  Outcome: Ongoing     Problem: Gas Exchange - Impaired  Goal: Absence of hypoxia  3/27/2021 0904 by Toby Prieto RN  Outcome: Ongoing  3/26/2021 2008 by Erin Drew RN  Outcome: Ongoing  Goal: Promote optimal lung function  3/27/2021 0904 by Toby Prieto RN  Outcome: Ongoing  3/26/2021 2008 by Erin Drew RN  Outcome: Ongoing     Problem: Breathing Pattern - Ineffective  Goal: Ability to achieve and maintain a regular respiratory rate  3/27/2021 0904 by Toby Prieto RN  Outcome: Ongoing  3/26/2021 2008 by Erin Drew RN  Outcome: Ongoing     Problem:  Body Temperature -  Risk of, Imbalanced  Goal: Ability to maintain a body temperature within defined limits  3/27/2021 0904 by Toby Prieto RN  Outcome: Ongoing  3/26/2021 2008 by Erin Drew RN  Outcome: Ongoing  Goal: Will regain or maintain usual level of consciousness  3/27/2021 0904 by Toby Prieto RN  Outcome: Ongoing  3/26/2021 2008 by Erin Drew RN  Outcome: Ongoing  Goal: Complications related to the disease process, condition or treatment will be avoided or minimized  3/27/2021 0904 by Toby Prieto RN  Outcome: Ongoing  3/26/2021 2008 by Erin Drew RN  Outcome: Ongoing     Problem: Isolation Precautions - Risk of Spread of Infection  Goal: Prevent transmission of infection  3/27/2021 0904 by Toby Prieto

## 2021-03-27 NOTE — CARE COORDINATION
Notified by physician that patient is on RA today and medically stable for discharge to SNF. Phoned Carolee in Admissions at [facility] to check on admission status and phone was answered by Heidy Hodges at [facility] . He stated that he will check with Carolee to see what admission status is  and return call to  .

## 2021-03-27 NOTE — PROGRESS NOTES
Hospitalist Progress Note      Name:  Adele Sanchez /Age/Sex: 1921  (Kay  [age] y.o. male)   MRN & CSN:  [id number] & [id number] Admission Date/Time: 3/20/2021  3:33 PM   Location:  -A PCP: Pal Guerin MD         Hospital Day: 8    Assessment and Plan:   Scotty Schultz a 89 y. o.  male  who presents with SOB     1) Acute Hypoxic Respiratory failure due to COVID-19 and possible gram positive pneumonia  -CTA chest with multifocal airspace disease. No PE  -COVID-19 positive on 3/20/2021.  -CRP and Procal trending down  -S/P CP; not a candidate for remdesivir due to kidney function  -Continue IV Rocephin and Azithro  -Continue decadron for 10 days  -Currently weaned off O2 and saturating >95% on RA     2) Asymptomatic bradycardia  -Avoid beta-blocker and calcium channel blockers  -2D echo essential normal; no evidence of pericardial effusion     3) Distal Esophageal wall thickening  -Noted on CT abdomen.  Likely GERD related.    -Evaluated by GI.  Plan for outpatient EGD in 2 weeks after discharge.  -Protonix 40 p.o. daily     4) Truong trauma  -Attempted truong placement yesterday which was discontinued due to discomfort after placement  -Had mild urethral bleeding  -Hold off further truong replacement  -Urology on board      Other Chronic medical condition; medication resumed unless contraindicated     Essential Hypertension     Glaucoma     CKD III     BPH/enlarged prostate     PT/OT evaluated; recommending SNF       Diet Dietary Nutrition Supplements: Diabetic Oral Supplement  DIET CARB CONTROL;  Dysphagia Minced and Moist   DVT Prophylaxis [] Lovenox, []  Heparin, [] SCDs, [] Ambulation   GI Prophylaxis [] PPI,  [] H2 Blocker,  [] Carafate,  [] Diet/Tube Feeds   Code Status Full Code   Disposition SNF   [facility]      History of Present Illness:     Patient was seen and examined  Denied any chest pain or worsening SOB  No dizziness, palpitations  No acute event overnight  Ten point ROS reviewed negative, unless as noted above    Objective: Intake/Output Summary (Last 24 hours) at 3/27/2021 1050  Last data filed at 3/26/2021 2318  Gross per 24 hour   Intake 360 ml   Output --   Net 360 ml      Vitals:   Vitals:    03/27/21 0902   BP: (!) 158/75   Pulse: 73   Resp: 19   Temp: 97.1 °F (36.2 °C)   SpO2: 91%     Physical Exam:   GEN Awake male, laying in bed in no apparent distress. Appears given age. EYES Pupils are equally round. No scleral erythema, discharge, or conjunctivitis. HENT Mucous membranes are moist. Oral pharynx without exudates, no evidence of thrush. NECK Supple, no apparent thyromegaly or masses. RESP Clear to auscultation, no wheezes, rales or rhonchi. Symmetric chest movement while on room air. CARDIO/VASC S1/S2 auscultated. Regular rate without appreciable murmurs, rubs, or gallops. No JVD or carotid bruits. Peripheral pulses equal bilaterally and palpable. No peripheral edema. GI Abdomen is soft without significant tenderness, masses, or guarding. Bowel sounds are normoactive. Rectal exam deferred.  No costovertebral angle tenderness. Giang catheter is not present. HEME/LYMPH No palpable cervical lymphadenopathy and no hepatosplenomegaly. No petechiae or ecchymoses. MSK No gross joint deformities. SKIN Normal coloration, warm, dry. NEURO Cranial nerves appear grossly intact, normal speech, no lateralizing weakness. PSYCH Awake, alert. Affect appropriate.     Medications:   Medications:    nystatin  5 mL Oral 4x Daily    insulin lispro  0-12 Units Subcutaneous TID     insulin lispro  0-6 Units Subcutaneous Nightly    amLODIPine  10 mg Oral Daily    azithromycin  500 mg Intravenous Q24H    apixaban  2.5 mg Oral BID    pantoprazole  40 mg Oral QAM AC    vitamin D  2,000 Units Oral Daily    sodium chloride flush  10 mL Intravenous 2 times per day    dexamethasone  6 mg Oral Daily    [Held by provider] carvedilol  25 mg Oral BID     [Held by provider]

## 2021-03-28 LAB
ANION GAP SERPL CALCULATED.3IONS-SCNC: 10 MMOL/L (ref 4–16)
BUN BLDV-MCNC: 29 MG/DL (ref 6–23)
CALCIUM SERPL-MCNC: 8.5 MG/DL (ref 8.3–10.6)
CHLORIDE BLD-SCNC: 99 MMOL/L (ref 99–110)
CO2: 25 MMOL/L (ref 21–32)
CREAT SERPL-MCNC: 1.1 MG/DL (ref 0.9–1.3)
GFR AFRICAN AMERICAN: >60 ML/MIN/1.73M2
GFR NON-AFRICAN AMERICAN: >60 ML/MIN/1.73M2
GLUCOSE BLD-MCNC: 212 MG/DL (ref 70–99)
GLUCOSE BLD-MCNC: 239 MG/DL (ref 70–99)
GLUCOSE BLD-MCNC: 244 MG/DL (ref 70–99)
GLUCOSE BLD-MCNC: 272 MG/DL (ref 70–99)
GLUCOSE BLD-MCNC: 315 MG/DL (ref 70–99)
HCT VFR BLD CALC: 37.9 % (ref 42–52)
HEMOGLOBIN: 12.8 GM/DL (ref 13.5–18)
MCH RBC QN AUTO: 29.9 PG (ref 27–31)
MCHC RBC AUTO-ENTMCNC: 33.8 % (ref 32–36)
MCV RBC AUTO: 88.6 FL (ref 78–100)
PDW BLD-RTO: 13.4 % (ref 11.7–14.9)
PLATELET # BLD: 260 K/CU MM (ref 140–440)
PMV BLD AUTO: 9.9 FL (ref 7.5–11.1)
POTASSIUM SERPL-SCNC: 3.8 MMOL/L (ref 3.5–5.1)
PROCALCITONIN: 0.12
RBC # BLD: 4.28 M/CU MM (ref 4.6–6.2)
SODIUM BLD-SCNC: 134 MMOL/L (ref 135–145)
WBC # BLD: 10.6 K/CU MM (ref 4–10.5)

## 2021-03-28 PROCEDURE — 6370000000 HC RX 637 (ALT 250 FOR IP): Performed by: INTERNAL MEDICINE

## 2021-03-28 PROCEDURE — 2060000000 HC ICU INTERMEDIATE R&B

## 2021-03-28 PROCEDURE — 82962 GLUCOSE BLOOD TEST: CPT

## 2021-03-28 PROCEDURE — 6360000002 HC RX W HCPCS: Performed by: INTERNAL MEDICINE

## 2021-03-28 PROCEDURE — 80048 BASIC METABOLIC PNL TOTAL CA: CPT

## 2021-03-28 PROCEDURE — 85027 COMPLETE CBC AUTOMATED: CPT

## 2021-03-28 PROCEDURE — 2580000003 HC RX 258: Performed by: INTERNAL MEDICINE

## 2021-03-28 PROCEDURE — 84145 PROCALCITONIN (PCT): CPT

## 2021-03-28 PROCEDURE — 94761 N-INVAS EAR/PLS OXIMETRY MLT: CPT

## 2021-03-28 RX ADMIN — SODIUM CHLORIDE, PRESERVATIVE FREE 10 ML: 5 INJECTION INTRAVENOUS at 20:58

## 2021-03-28 RX ADMIN — NYSTATIN 500000 UNITS: 100000 SUSPENSION ORAL at 08:36

## 2021-03-28 RX ADMIN — NYSTATIN 500000 UNITS: 100000 SUSPENSION ORAL at 12:14

## 2021-03-28 RX ADMIN — INSULIN LISPRO 2 UNITS: 100 INJECTION, SOLUTION INTRAVENOUS; SUBCUTANEOUS at 21:01

## 2021-03-28 RX ADMIN — APIXABAN 2.5 MG: 2.5 TABLET, FILM COATED ORAL at 08:37

## 2021-03-28 RX ADMIN — LEVOTHYROXINE SODIUM 50 MCG: 50 TABLET ORAL at 05:59

## 2021-03-28 RX ADMIN — TAMSULOSIN HYDROCHLORIDE 0.4 MG: 0.4 CAPSULE ORAL at 08:36

## 2021-03-28 RX ADMIN — NYSTATIN 500000 UNITS: 100000 SUSPENSION ORAL at 16:39

## 2021-03-28 RX ADMIN — Medication 2000 UNITS: at 08:36

## 2021-03-28 RX ADMIN — AMLODIPINE BESYLATE 10 MG: 10 TABLET ORAL at 08:37

## 2021-03-28 RX ADMIN — ATORVASTATIN CALCIUM 10 MG: 10 TABLET, FILM COATED ORAL at 08:36

## 2021-03-28 RX ADMIN — NYSTATIN 500000 UNITS: 100000 SUSPENSION ORAL at 20:58

## 2021-03-28 RX ADMIN — LOSARTAN POTASSIUM 100 MG: 50 TABLET, FILM COATED ORAL at 08:37

## 2021-03-28 RX ADMIN — PANTOPRAZOLE SODIUM 40 MG: 40 TABLET, DELAYED RELEASE ORAL at 05:59

## 2021-03-28 RX ADMIN — APIXABAN 2.5 MG: 2.5 TABLET, FILM COATED ORAL at 20:58

## 2021-03-28 RX ADMIN — DEXAMETHASONE 8 MG: 4 TABLET ORAL at 08:37

## 2021-03-28 NOTE — PROGRESS NOTES
Hospitalist Progress Note      Name:  Inge Interiano /Age/Sex: 1921  (80 y.o. male)   MRN & CSN:  [id number] & [id number] Admission Date/Time: 3/20/2021  3:33 PM   Location:  -A PCP: Henry Cordova MD         Hospital Day: 9    Assessment and Plan:   Franky Osei a 89 y. o.  male  who presents with SOB     1) Acute Hypoxic Respiratory failure due to COVID-19 and possible gram positive pneumonia  -CTA chest with multifocal airspace disease. No PE  -COVID-19 positive on 3/20/2021.  -CRP and Procal trending down  -S/P CP; not a candidate for remdesivir due to kidney function  -Continue IV Rocephin and Azithro  -Continue decadron for 10 days  -Currently weaned off O2 and saturating >95% on RA     2) Asymptomatic bradycardia  -Avoid beta-blocker and calcium channel blockers  -2D echo essential normal; no evidence of pericardial effusion     3) Distal Esophageal wall thickening  -Noted on CT abdomen.  Likely GERD related.    -Evaluated by GI.  Plan for outpatient EGD in 2 weeks after discharge.  -Protonix 40 p.o. daily     4) Truong trauma  -Attempted troung placement yesterday which was discontinued due to discomfort after placement  -Had mild urethral bleeding  -Hold off further truong replacement  -Urology on board      Other Chronic medical condition; medication resumed unless contraindicated     Essential Hypertension     Glaucoma     CKD III     BPH/enlarged prostate     PT/OT evaluated; recommending SNF    Diet Dietary Nutrition Supplements: Diabetic Oral Supplement  DIET CARB CONTROL;  Dysphagia Minced and Moist   DVT Prophylaxis [] Lovenox, []  Heparin, [] SCDs, [] Ambulation   GI Prophylaxis [] PPI,  [] H2 Blocker,  [] Carafate,  [] Diet/Tube Feeds   Code Status Full Code   Disposition SNF   [facility]      History of Present Illness:     Patient was seen and examined  Denied any worsening SOB  No chest or abdominal pain  No acute event overnight    Ten point ROS reviewed negative, 100 mg Oral Daily    levothyroxine  50 mcg Oral Daily    tamsulosin  0.4 mg Oral Daily    atorvastatin  10 mg Oral Daily    latanoprost  1 drop Both Eyes Nightly      Infusions:    dextrose      sodium chloride       PRN Meds: glucose, 15 g, PRN  dextrose, 12.5 g, PRN  glucagon (rDNA), 1 mg, PRN  dextrose, 100 mL/hr, PRN  hydrALAZINE, 10 mg, Q6H PRN  sodium chloride flush, 10 mL, PRN  promethazine, 12.5 mg, Q6H PRN    Or  ondansetron, 4 mg, Q6H PRN  polyethylene glycol, 17 g, Daily PRN  acetaminophen, 650 mg, Q6H PRN    Or  acetaminophen, 650 mg, Q6H PRN  sodium chloride, , PRN          Electronically signed by Jostin Melgar MD on 3/28/2021 at 11:23 AM

## 2021-03-29 VITALS
BODY MASS INDEX: 23.31 KG/M2 | HEIGHT: 74 IN | OXYGEN SATURATION: 94 % | DIASTOLIC BLOOD PRESSURE: 86 MMHG | SYSTOLIC BLOOD PRESSURE: 174 MMHG | TEMPERATURE: 97.6 F | WEIGHT: 181.66 LBS | HEART RATE: 86 BPM | RESPIRATION RATE: 22 BRPM

## 2021-03-29 LAB
ANION GAP SERPL CALCULATED.3IONS-SCNC: 10 MMOL/L (ref 4–16)
BUN BLDV-MCNC: 25 MG/DL (ref 6–23)
CALCIUM SERPL-MCNC: 8.7 MG/DL (ref 8.3–10.6)
CHLORIDE BLD-SCNC: 100 MMOL/L (ref 99–110)
CO2: 27 MMOL/L (ref 21–32)
CREAT SERPL-MCNC: 1 MG/DL (ref 0.9–1.3)
GFR AFRICAN AMERICAN: >60 ML/MIN/1.73M2
GFR NON-AFRICAN AMERICAN: >60 ML/MIN/1.73M2
GLUCOSE BLD-MCNC: 155 MG/DL (ref 70–99)
GLUCOSE BLD-MCNC: 178 MG/DL (ref 70–99)
GLUCOSE BLD-MCNC: 246 MG/DL (ref 70–99)
HCT VFR BLD CALC: 41.2 % (ref 42–52)
HEMOGLOBIN: 14 GM/DL (ref 13.5–18)
HIGH SENSITIVE C-REACTIVE PROTEIN: 17.7 MG/L
MCH RBC QN AUTO: 29.9 PG (ref 27–31)
MCHC RBC AUTO-ENTMCNC: 34 % (ref 32–36)
MCV RBC AUTO: 88 FL (ref 78–100)
PDW BLD-RTO: 13.4 % (ref 11.7–14.9)
PLATELET # BLD: 300 K/CU MM (ref 140–440)
PMV BLD AUTO: 9.8 FL (ref 7.5–11.1)
POTASSIUM SERPL-SCNC: 3.8 MMOL/L (ref 3.5–5.1)
RBC # BLD: 4.68 M/CU MM (ref 4.6–6.2)
SODIUM BLD-SCNC: 137 MMOL/L (ref 135–145)
WBC # BLD: 10.1 K/CU MM (ref 4–10.5)

## 2021-03-29 PROCEDURE — 6370000000 HC RX 637 (ALT 250 FOR IP): Performed by: INTERNAL MEDICINE

## 2021-03-29 PROCEDURE — 2580000003 HC RX 258: Performed by: INTERNAL MEDICINE

## 2021-03-29 PROCEDURE — 97535 SELF CARE MNGMENT TRAINING: CPT

## 2021-03-29 PROCEDURE — 94761 N-INVAS EAR/PLS OXIMETRY MLT: CPT

## 2021-03-29 PROCEDURE — 6360000002 HC RX W HCPCS: Performed by: INTERNAL MEDICINE

## 2021-03-29 PROCEDURE — 97530 THERAPEUTIC ACTIVITIES: CPT

## 2021-03-29 PROCEDURE — 80048 BASIC METABOLIC PNL TOTAL CA: CPT

## 2021-03-29 PROCEDURE — 86141 C-REACTIVE PROTEIN HS: CPT

## 2021-03-29 PROCEDURE — 82962 GLUCOSE BLOOD TEST: CPT

## 2021-03-29 PROCEDURE — 85027 COMPLETE CBC AUTOMATED: CPT

## 2021-03-29 RX ORDER — CARVEDILOL 6.25 MG/1
6.25 TABLET ORAL 2 TIMES DAILY WITH MEALS
DISCHARGE
Start: 2021-03-29 | End: 2021-03-29 | Stop reason: HOSPADM

## 2021-03-29 RX ORDER — AMLODIPINE BESYLATE 10 MG/1
10 TABLET ORAL DAILY
Qty: 30 TABLET | Refills: 3 | DISCHARGE
Start: 2021-03-30

## 2021-03-29 RX ORDER — LATANOPROST 50 UG/ML
1 SOLUTION/ DROPS OPHTHALMIC NIGHTLY
Qty: 1 BOTTLE | Refills: 3 | DISCHARGE
Start: 2021-03-29

## 2021-03-29 RX ORDER — DEXAMETHASONE 6 MG/1
6 TABLET ORAL DAILY
Qty: 2 TABLET | Refills: 0 | DISCHARGE
Start: 2021-03-30 | End: 2021-04-01

## 2021-03-29 RX ORDER — PANTOPRAZOLE SODIUM 40 MG/1
40 TABLET, DELAYED RELEASE ORAL
Qty: 30 TABLET | Refills: 3 | DISCHARGE
Start: 2021-03-30

## 2021-03-29 RX ADMIN — LEVOTHYROXINE SODIUM 50 MCG: 50 TABLET ORAL at 05:41

## 2021-03-29 RX ADMIN — DEXAMETHASONE 6 MG: 4 TABLET ORAL at 09:34

## 2021-03-29 RX ADMIN — NYSTATIN 500000 UNITS: 100000 SUSPENSION ORAL at 09:35

## 2021-03-29 RX ADMIN — ATORVASTATIN CALCIUM 10 MG: 10 TABLET, FILM COATED ORAL at 09:34

## 2021-03-29 RX ADMIN — AMLODIPINE BESYLATE 10 MG: 10 TABLET ORAL at 09:34

## 2021-03-29 RX ADMIN — APIXABAN 2.5 MG: 2.5 TABLET, FILM COATED ORAL at 09:34

## 2021-03-29 RX ADMIN — LOSARTAN POTASSIUM 100 MG: 50 TABLET, FILM COATED ORAL at 09:34

## 2021-03-29 RX ADMIN — Medication 2000 UNITS: at 09:34

## 2021-03-29 RX ADMIN — TAMSULOSIN HYDROCHLORIDE 0.4 MG: 0.4 CAPSULE ORAL at 09:34

## 2021-03-29 RX ADMIN — PANTOPRAZOLE SODIUM 40 MG: 40 TABLET, DELAYED RELEASE ORAL at 05:41

## 2021-03-29 RX ADMIN — NYSTATIN 500000 UNITS: 100000 SUSPENSION ORAL at 14:38

## 2021-03-29 RX ADMIN — SODIUM CHLORIDE, PRESERVATIVE FREE 10 ML: 5 INJECTION INTRAVENOUS at 14:38

## 2021-03-29 NOTE — PROGRESS NOTES
levothyroxine  50 mcg Oral Daily    tamsulosin  0.4 mg Oral Daily    atorvastatin  10 mg Oral Daily    latanoprost  1 drop Both Eyes Nightly      Infusions:    dextrose      sodium chloride       PRN Meds: glucose, 15 g, PRN  dextrose, 12.5 g, PRN  glucagon (rDNA), 1 mg, PRN  dextrose, 100 mL/hr, PRN  hydrALAZINE, 10 mg, Q6H PRN  sodium chloride flush, 10 mL, PRN  promethazine, 12.5 mg, Q6H PRN    Or  ondansetron, 4 mg, Q6H PRN  polyethylene glycol, 17 g, Daily PRN  acetaminophen, 650 mg, Q6H PRN    Or  acetaminophen, 650 mg, Q6H PRN  sodium chloride, , PRN          Electronically signed by Kate Alston MD on 3/29/2021 at 12:13 PM

## 2021-03-29 NOTE — CARE COORDINATION
Pt discharged to [facility] [place],  Set up with [facility] for 1700. Nursing, SNU and daughter Denise Crews agreeable with plan.

## 2021-03-29 NOTE — PROGRESS NOTES
Occupational Therapy  Occupational Therapy Treatment Note    Date:  3/29/2021   Room:  -MONA Cuevas :   1921   MRN: [id number] Admission Date: 3/20/2021     Diagnosis:  The encounter diagnosis was Pneumonia due to COVID-19 virus. Restrictions/Precautions:                      Communication with other providers:  RN. Subjective:  Patient states:  \"I guess I am leaving here tomorrow, baby\"  Pain:   Location, Type, Intensity (0/10 to 10/10):  none    Objective:    Orientation: WFL   Observation: Pt received in bed. Objective Measures:  Room air. VSS    Treatment, including education:  Therapeutic Activity Training:   Therapeutic activity training was instructed today. Cues were given for safety, sequence, UE/LE placement, visual cues, and balance. Self Care Training:   Self care training was performed today. Cues were given for safety, sequence, UE/LE placement, visual cues, and balance. Supine to sit: SBA, bed flat, via long sit    Sit to stand: CGA from EOB , Carlos Enrique from low commode c tactile cue to use grab bar    Standing: SBA-CGA during ADL, 0-1 UE supported    Stand to sit: CGA, cue for hand placement and safety with walker when approaching the seat, sat to low commode and chair    Gait: CGA c RW to/from bathroom    Dressing LB: ModA for distal components of pants threading + socks. He manages hiking up from knee level without difficulty and 0 UE support    Toileting:BM on low commode, manages pants hike and buttock wiping standing c 0-1 UE support and CGA       Assessment / Impression:      Patient's tolerance of treatment:  WFL    Significant change in status and impact:  Much improved  Barriers to improvement:  none      Plan for Next Session:    Cont POC addressing goals:    Goals:  By d/c or goals met:  Pt will perform all bed mobility with Carlos Enrique in prep for EOB/OOB activity.    Pt will perform all functional transfers with Carlos Enrique and appropriate use of LRD to bed, toilet,

## 2021-03-29 NOTE — CARE COORDINATION
Spoke with Dr Hal Freeman and pt ready for discharge today. Spoke with Carolee at [place] she started prior Auth on Saturday. She will call this CM once precert completed. 1030 Updated pt's daughter Matilde. [facility] approved per insurance for [place], [place] to Dr Hal Freeman.

## 2021-03-29 NOTE — PROGRESS NOTES
pulmonary      SUBJECTIVE: weak but stable     OBJECTIVE    VITALS:  BP (!) 147/94   Pulse 76   Temp 97.6 °F (36.4 °C) (Axillary)   Resp 15   Ht 6' 2.02\" (1.88 m)   Wt 181 lb 10.5 oz (82.4 kg)   SpO2 94%   BMI 23.31 kg/m²   HEAD AND FACE EXAM:  No throat injection, no active exudate,no thrush  NECK EXAM;No JVD, no masses, symmetrical  CHEST EXAM; Expansion equal and symmetrical, no masses  LUNG EXAM; Good breath sounds bilaterally. There are expiratory wheezes both lungs, there are crackles at both lung bases  CARDIOVASCULAR EXAM: Positive S1 and S2, no S3 or S4, no clicks ,no murmurs  RIGHT AND LEFT LOWER EXTRIMITY EXAM: No edema, no swelling, no inflamation            LABS   Lab Results   Component Value Date    WBC 10.1 03/29/2021    HGB 14.0 03/29/2021    HCT 41.2 (L) 03/29/2021    MCV 88.0 03/29/2021     03/29/2021     Lab Results   Component Value Date    CREATININE 1.0 03/29/2021    BUN 25 (H) 03/29/2021     03/29/2021    K 3.8 03/29/2021     03/29/2021    CO2 27 03/29/2021     Lab Results   Component Value Date    INR 1.03 03/21/2021    PROTIME 12.5 03/21/2021          Lab Results   Component Value Date    PHOS 2.5 12/06/2014      No results for input(s): PH, PO2ART, LXS8SLC, HCO3, BEART, O2SAT in the last 72 hours. Wt Readings from Last 3 Encounters:   03/29/21 181 lb 10.5 oz (82.4 kg)   11/09/20 193 lb 3.2 oz (87.6 kg)   10/18/20 192 lb (87.1 kg)               ASSESMENT  Ac resp failure  covid pneumonia  brian pneumonia        PLAN  1. cpm  2. Cont o2  3.  Agree with snf    3/29/2021  Katty Sevilla M.D.

## 2021-03-29 NOTE — DISCHARGE SUMMARY
Discharge Summary    Name:  Annie Bryant /Age/Sex: 1921  (Kay  [age] y.o. male)   MRN & CSN:  [id number] & [id number] Admission Date/Time: 3/20/2021  3:33 PM   Attending:  Thomas Laurent MD Discharging Physician: Ashtyn Eric MD     Hospital Course: Isabela Mccarthy is a 89 y. o.  male  who presents with SOB     1) Acute Hypoxic Respiratory failure due to COVID-19 and possible gram positive pneumonia  -CTA chest with multifocal airspace disease. No PE  -COVID-19 positive on 3/20/2021.  -CRP and Procal trended down  -S/P CP; not a candidate for remdesivir due to kidney function  -Completed IV Rocephin and Azithro  -Complete decadron for 10 days  -Currently weaned off O2 and saturating >95% on RA  -Continue PT/OT at SNF     2) Asymptomatic bradycardia  -Avoid beta-blocker and calcium channel blockers  -2D echo essential normal; no evidence of pericardial effusion     3) Distal Esophageal wall thickening  -Noted on CT abdomen.  Likely GERD related.    -Evaluated by GI.  Plan for outpatient EGD in 2 weeks after discharge.  -Protonix 40 p.o. daily     4) Truong trauma; resolved  -Attempted truong placement which was discontinued due to discomfort after placement  -Had mild urethral bleeding  -Hold off further truong replacement  -Urology on board; signed off     Other Chronic medical condition; medication resumed unless contraindicated     Essential Hypertension     Glaucoma     CKD III     BPH/enlarged prostate     [place]    The patient expressed appropriate understanding of and agreement with the discharge recommendations, medications, and plan.      Consults this admission:  IP CONSULT TO HOSPITALIST  IP CONSULT TO PULMONOLOGY  IP CONSULT TO CARDIOLOGY  IP CONSULT TO GI  IP CONSULT TO IV TEAM  IP CONSULT TO UROLOGY    Discharge Instruction:   Follow up appointments:   Primary care physician:  within 2 weeks    Diet:  regular diet   Activity: activity as tolerated  Disposition: Discharged to:   []Home, tenderness, masses, or guarding. Bowel sounds are normoactive. Rectal exam deferred.  No costovertebral angle tenderness. Normal appearing external genitalia. Giang catheter is not present. HEME/LYMPH No palpable cervical lymphadenopathy and no hepatosplenomegaly. No petechiae or ecchymoses. MSK No gross joint deformities. SKIN Normal coloration, warm, dry. NEURO Cranial nerves appear grossly intact, normal speech, no lateralizing weakness. PSYCH Awake, alert, oriented x 4. Affect appropriate.     BMP/CBC  Recent Labs     03/27/21  0600 03/28/21  0600 03/29/21  0550    134* 137   K 3.7 3.8 3.8    99 100   CO2 25 25 27   BUN 30* 29* 25*   CREATININE 1.1 1.1 1.0   WBC 10.7* 10.6* 10.1   HCT 40.3* 37.9* 41.2*    260 300       IMAGING:  AS above    Discharge Time of 35 minutes    Electronically signed by Kathia Parks MD on 3/29/2021 at 3:49 PM

## 2021-03-29 NOTE — PLAN OF CARE
transmission of infection  3/29/2021 1328 by Jennifer Arroyo RN  Outcome: Ongoing  3/29/2021 0213 by Fareed Starkey RN  Outcome: Ongoing     Problem: Nutrition Deficits  Goal: Optimize nutritional status  3/29/2021 1328 by Jennifer Arroyo RN  Outcome: Ongoing  3/29/2021 0213 by Fareed Starkey RN  Outcome: Ongoing     Problem: Risk for Fluid Volume Deficit  Goal: Maintain normal heart rhythm  3/29/2021 1328 by Jennifer Arroyo RN  Outcome: Ongoing  3/29/2021 0213 by Fareed Starkey RN  Outcome: Ongoing  Goal: Maintain absence of muscle cramping  3/29/2021 1328 by Jennifer Arroyo RN  Outcome: Ongoing  3/29/2021 0213 by Fareed Starkey RN  Outcome: Ongoing  Goal: Maintain normal serum potassium, sodium, calcium, phosphorus, and pH  3/29/2021 1328 by Jennifer Arroyo RN  Outcome: Ongoing  3/29/2021 0213 by Fareed Starkey RN  Outcome: Ongoing     Problem: Loneliness or Risk for Loneliness  Goal: Demonstrate positive use of time alone when socialization is not possible  3/29/2021 1328 by Jennifer Arroyo RN  Outcome: Ongoing  3/29/2021 0213 by Fareed Starkey RN  Outcome: Ongoing     Problem: Fatigue  Goal: Verbalize increase energy and improved vitality  3/29/2021 1328 by Jennifer Arroyo RN  Outcome: Ongoing  3/29/2021 0213 by Fareed Starkey RN  Outcome: Ongoing     Problem: Patient Education: Go to Patient Education Activity  Goal: Patient/Family Education  3/29/2021 1328 by Jennifer Arroyo RN  Outcome: Ongoing  3/29/2021 0213 by Fraeed Starkey RN  Outcome: Ongoing     Problem: Skin Integrity:  Goal: Will show no infection signs and symptoms  Description: Will show no infection signs and symptoms  3/29/2021 1328 by Jennifer Arroyo RN  Outcome: Ongoing  3/29/2021 0213 by Fareed Starkey RN  Outcome: Ongoing  Goal: Absence of new skin breakdown  Description: Absence of new skin breakdown  3/29/2021 1328 by Jennifer Arroyo RN  Outcome: Ongoing  3/29/2021 0213 by Melanie Abad RN  Outcome: Ongoing

## 2021-03-29 NOTE — PROGRESS NOTES
PT transported to [facility].  Report Shira Wheeler at [facility] and transport team

## 2021-03-30 ENCOUNTER — HOSPITAL ENCOUNTER (OUTPATIENT)
Age: 86
Setting detail: SPECIMEN
Discharge: HOME | End: 2021-03-30

## 2021-03-30 LAB
ALBUMIN SERPL-MCNC: 2.5 GM/DL (ref 3.4–5)
ALP BLD-CCNC: 72 IU/L (ref 40–129)
ALT SERPL-CCNC: 23 U/L (ref 10–40)
ANION GAP SERPL CALCULATED.3IONS-SCNC: 12 MMOL/L (ref 4–16)
AST SERPL-CCNC: 22 IU/L (ref 15–37)
BASOPHILS ABSOLUTE: 0 K/CU MM
BASOPHILS RELATIVE PERCENT: 0.1 % (ref 0–1)
BILIRUB SERPL-MCNC: 0.6 MG/DL (ref 0–1)
BUN BLDV-MCNC: 37 MG/DL (ref 6–23)
CALCIUM SERPL-MCNC: 7.8 MG/DL (ref 8.3–10.6)
CHLORIDE BLD-SCNC: 93 MMOL/L (ref 99–110)
CO2: 25 MMOL/L (ref 21–32)
CREAT SERPL-MCNC: 1.5 MG/DL (ref 0.9–1.3)
DIFFERENTIAL TYPE: ABNORMAL
EOSINOPHILS ABSOLUTE: 0 K/CU MM
EOSINOPHILS RELATIVE PERCENT: 0 % (ref 0–3)
GFR AFRICAN AMERICAN: 52 ML/MIN/1.73M2
GFR NON-AFRICAN AMERICAN: 43 ML/MIN/1.73M2
GLUCOSE BLD-MCNC: 324 MG/DL (ref 70–99)
HCT VFR BLD CALC: 36.5 % (ref 42–52)
HEMOGLOBIN: 12.4 GM/DL (ref 13.5–18)
IMMATURE NEUTROPHIL %: 1 % (ref 0–0.43)
LYMPHOCYTES ABSOLUTE: 0.6 K/CU MM
LYMPHOCYTES RELATIVE PERCENT: 6.8 % (ref 24–44)
MCH RBC QN AUTO: 30.3 PG (ref 27–31)
MCHC RBC AUTO-ENTMCNC: 34 % (ref 32–36)
MCV RBC AUTO: 89.2 FL (ref 78–100)
MONOCYTES ABSOLUTE: 0.3 K/CU MM
MONOCYTES RELATIVE PERCENT: 3.5 % (ref 0–4)
NUCLEATED RBC %: 0 %
PDW BLD-RTO: 13.4 % (ref 11.7–14.9)
PLATELET # BLD: 316 K/CU MM (ref 140–440)
PMV BLD AUTO: 10.7 FL (ref 7.5–11.1)
POTASSIUM SERPL-SCNC: 4.5 MMOL/L (ref 3.5–5.1)
RBC # BLD: 4.09 M/CU MM (ref 4.6–6.2)
SEGMENTED NEUTROPHILS ABSOLUTE COUNT: 8.1 K/CU MM
SEGMENTED NEUTROPHILS RELATIVE PERCENT: 88.6 % (ref 36–66)
SODIUM BLD-SCNC: 130 MMOL/L (ref 135–145)
TOTAL IMMATURE NEUTOROPHIL: 0.09 K/CU MM
TOTAL NUCLEATED RBC: 0 K/CU MM
TOTAL PROTEIN: 4.9 GM/DL (ref 6.4–8.2)
WBC # BLD: 9.1 K/CU MM (ref 4–10.5)

## 2021-03-30 PROCEDURE — 80053 COMPREHEN METABOLIC PANEL: CPT

## 2021-03-30 PROCEDURE — 85025 COMPLETE CBC W/AUTO DIFF WBC: CPT

## 2021-03-30 PROCEDURE — 36415 COLL VENOUS BLD VENIPUNCTURE: CPT

## 2021-04-06 ENCOUNTER — HOSPITAL ENCOUNTER (OUTPATIENT)
Age: 86
Setting detail: SPECIMEN
Discharge: HOME | End: 2021-04-06

## 2021-04-06 LAB
ALBUMIN SERPL-MCNC: 2.5 GM/DL (ref 3.4–5)
ALP BLD-CCNC: 68 IU/L (ref 40–128)
ALT SERPL-CCNC: 13 U/L (ref 10–40)
ANION GAP SERPL CALCULATED.3IONS-SCNC: 9 MMOL/L (ref 4–16)
AST SERPL-CCNC: 16 IU/L (ref 15–37)
BILIRUB SERPL-MCNC: 0.7 MG/DL (ref 0–1)
BUN BLDV-MCNC: 32 MG/DL (ref 6–23)
CALCIUM SERPL-MCNC: 8 MG/DL (ref 8.3–10.6)
CHLORIDE BLD-SCNC: 101 MMOL/L (ref 99–110)
CO2: 26 MMOL/L (ref 21–32)
CREAT SERPL-MCNC: 2.3 MG/DL (ref 0.9–1.3)
GFR AFRICAN AMERICAN: 32 ML/MIN/1.73M2
GFR NON-AFRICAN AMERICAN: 26 ML/MIN/1.73M2
GLUCOSE BLD-MCNC: 163 MG/DL (ref 70–99)
HCT VFR BLD CALC: 34.4 % (ref 42–52)
HEMOGLOBIN: 11.3 GM/DL (ref 13.5–18)
MCH RBC QN AUTO: 30.4 PG (ref 27–31)
MCHC RBC AUTO-ENTMCNC: 32.8 % (ref 32–36)
MCV RBC AUTO: 92.5 FL (ref 78–100)
PDW BLD-RTO: 13.9 % (ref 11.7–14.9)
PLATELET # BLD: 210 K/CU MM (ref 140–440)
PMV BLD AUTO: 10.9 FL (ref 7.5–11.1)
POTASSIUM SERPL-SCNC: 3.4 MMOL/L (ref 3.5–5.1)
RBC # BLD: 3.72 M/CU MM (ref 4.6–6.2)
SODIUM BLD-SCNC: 136 MMOL/L (ref 135–145)
TOTAL PROTEIN: 5 GM/DL (ref 6.4–8.2)
WBC # BLD: 5.1 K/CU MM (ref 4–10.5)

## 2021-04-06 PROCEDURE — 36415 COLL VENOUS BLD VENIPUNCTURE: CPT

## 2021-04-06 PROCEDURE — 85027 COMPLETE CBC AUTOMATED: CPT

## 2021-04-06 PROCEDURE — 80053 COMPREHEN METABOLIC PANEL: CPT

## 2021-04-13 ENCOUNTER — HOSPITAL ENCOUNTER (OUTPATIENT)
Age: 86
Setting detail: SPECIMEN
Discharge: HOME | End: 2021-04-13

## 2021-04-13 LAB
ALBUMIN SERPL-MCNC: 2.8 GM/DL (ref 3.4–5)
ALP BLD-CCNC: 86 IU/L (ref 40–128)
ALT SERPL-CCNC: 14 U/L (ref 10–40)
ANION GAP SERPL CALCULATED.3IONS-SCNC: 11 MMOL/L (ref 4–16)
AST SERPL-CCNC: 19 IU/L (ref 15–37)
BILIRUB SERPL-MCNC: 0.3 MG/DL (ref 0–1)
BUN BLDV-MCNC: 20 MG/DL (ref 6–23)
CALCIUM SERPL-MCNC: 8.6 MG/DL (ref 8.3–10.6)
CHLORIDE BLD-SCNC: 101 MMOL/L (ref 99–110)
CO2: 25 MMOL/L (ref 21–32)
CREAT SERPL-MCNC: 1.7 MG/DL (ref 0.9–1.3)
GFR AFRICAN AMERICAN: 45 ML/MIN/1.73M2
GFR NON-AFRICAN AMERICAN: 37 ML/MIN/1.73M2
GLUCOSE BLD-MCNC: 108 MG/DL (ref 70–99)
HCT VFR BLD CALC: 35.3 % (ref 42–52)
HEMOGLOBIN: 11.4 GM/DL (ref 13.5–18)
MCH RBC QN AUTO: 29.9 PG (ref 27–31)
MCHC RBC AUTO-ENTMCNC: 32.3 % (ref 32–36)
MCV RBC AUTO: 92.7 FL (ref 78–100)
PDW BLD-RTO: 14.1 % (ref 11.7–14.9)
PLATELET # BLD: 142 K/CU MM (ref 140–440)
PMV BLD AUTO: 11.6 FL (ref 7.5–11.1)
POTASSIUM SERPL-SCNC: 3.8 MMOL/L (ref 3.5–5.1)
RBC # BLD: 3.81 M/CU MM (ref 4.6–6.2)
SODIUM BLD-SCNC: 137 MMOL/L (ref 135–145)
TOTAL PROTEIN: 5.4 GM/DL (ref 6.4–8.2)
WBC # BLD: 5.3 K/CU MM (ref 4–10.5)

## 2021-04-13 PROCEDURE — 80053 COMPREHEN METABOLIC PANEL: CPT

## 2021-04-13 PROCEDURE — 85027 COMPLETE CBC AUTOMATED: CPT

## 2021-04-13 PROCEDURE — 36415 COLL VENOUS BLD VENIPUNCTURE: CPT

## 2021-04-19 RX ORDER — CARVEDILOL 25 MG/1
TABLET ORAL
Qty: 180 TABLET | Refills: 3 | OUTPATIENT
Start: 2021-04-19

## 2021-04-21 ENCOUNTER — HOSPITAL ENCOUNTER (OUTPATIENT)
Age: 86
Setting detail: SPECIMEN
Discharge: HOME | End: 2021-04-21

## 2021-04-21 LAB
ALBUMIN SERPL-MCNC: 2.8 GM/DL (ref 3.4–5)
ALP BLD-CCNC: 103 IU/L (ref 40–128)
ALT SERPL-CCNC: 23 U/L (ref 10–40)
ANION GAP SERPL CALCULATED.3IONS-SCNC: 11 MMOL/L (ref 4–16)
AST SERPL-CCNC: 26 IU/L (ref 15–37)
BILIRUB SERPL-MCNC: 0.3 MG/DL (ref 0–1)
BUN BLDV-MCNC: 17 MG/DL (ref 6–23)
CALCIUM SERPL-MCNC: 8.7 MG/DL (ref 8.3–10.6)
CHLORIDE BLD-SCNC: 97 MMOL/L (ref 99–110)
CO2: 25 MMOL/L (ref 21–32)
CREAT SERPL-MCNC: 1.3 MG/DL (ref 0.9–1.3)
GFR AFRICAN AMERICAN: >60 ML/MIN/1.73M2
GFR NON-AFRICAN AMERICAN: 51 ML/MIN/1.73M2
GLUCOSE BLD-MCNC: 125 MG/DL (ref 70–99)
HCT VFR BLD CALC: 33.4 % (ref 42–52)
HEMOGLOBIN: 10.7 GM/DL (ref 13.5–18)
MCH RBC QN AUTO: 29.7 PG (ref 27–31)
MCHC RBC AUTO-ENTMCNC: 32 % (ref 32–36)
MCV RBC AUTO: 92.8 FL (ref 78–100)
PDW BLD-RTO: 14.9 % (ref 11.7–14.9)
PLATELET # BLD: 127 K/CU MM (ref 140–440)
PMV BLD AUTO: 11.3 FL (ref 7.5–11.1)
POTASSIUM SERPL-SCNC: 4.3 MMOL/L (ref 3.5–5.1)
RBC # BLD: 3.6 M/CU MM (ref 4.6–6.2)
SODIUM BLD-SCNC: 133 MMOL/L (ref 135–145)
TOTAL PROTEIN: 5.5 GM/DL (ref 6.4–8.2)
WBC # BLD: 7.8 K/CU MM (ref 4–10.5)

## 2021-04-21 PROCEDURE — 80053 COMPREHEN METABOLIC PANEL: CPT

## 2021-04-21 PROCEDURE — 36415 COLL VENOUS BLD VENIPUNCTURE: CPT

## 2021-04-21 PROCEDURE — 85027 COMPLETE CBC AUTOMATED: CPT

## 2021-04-27 ENCOUNTER — HOSPITAL ENCOUNTER (OUTPATIENT)
Age: 86
Setting detail: SPECIMEN
Discharge: HOME | End: 2021-04-27

## 2021-04-27 PROCEDURE — 81001 URINALYSIS AUTO W/SCOPE: CPT

## 2021-04-27 PROCEDURE — 87086 URINE CULTURE/COLONY COUNT: CPT

## 2021-04-28 ENCOUNTER — HOSPITAL ENCOUNTER (OUTPATIENT)
Age: 86
Setting detail: SPECIMEN
Discharge: HOME | End: 2021-04-28

## 2021-04-28 LAB
ALBUMIN SERPL-MCNC: 3.1 GM/DL (ref 3.4–5)
ALP BLD-CCNC: 115 IU/L (ref 40–128)
ALT SERPL-CCNC: 24 U/L (ref 10–40)
ANION GAP SERPL CALCULATED.3IONS-SCNC: 11 MMOL/L (ref 4–16)
AST SERPL-CCNC: 35 IU/L (ref 15–37)
BACTERIA: NEGATIVE /HPF
BILIRUB SERPL-MCNC: 0.5 MG/DL (ref 0–1)
BILIRUBIN URINE: NEGATIVE MG/DL
BLOOD, URINE: NEGATIVE
BUN BLDV-MCNC: 17 MG/DL (ref 6–23)
CALCIUM SERPL-MCNC: 8.9 MG/DL (ref 8.3–10.6)
CHLORIDE BLD-SCNC: 100 MMOL/L (ref 99–110)
CLARITY: CLEAR
CO2: 25 MMOL/L (ref 21–32)
COLOR: YELLOW
CREAT SERPL-MCNC: 1.4 MG/DL (ref 0.9–1.3)
GFR AFRICAN AMERICAN: 56 ML/MIN/1.73M2
GFR NON-AFRICAN AMERICAN: 47 ML/MIN/1.73M2
GLUCOSE BLD-MCNC: 108 MG/DL (ref 70–99)
GLUCOSE, URINE: NEGATIVE MG/DL
HCT VFR BLD CALC: 34.5 % (ref 42–52)
HEMOGLOBIN: 11.1 GM/DL (ref 13.5–18)
HYALINE CASTS: 5 /LPF
KETONES, URINE: NEGATIVE MG/DL
LEUKOCYTE ESTERASE, URINE: NEGATIVE
MCH RBC QN AUTO: 29.8 PG (ref 27–31)
MCHC RBC AUTO-ENTMCNC: 32.2 % (ref 32–36)
MCV RBC AUTO: 92.7 FL (ref 78–100)
MUCUS: ABNORMAL HPF
NITRITE URINE, QUANTITATIVE: NEGATIVE
NON SQUAM EPI CELLS: <1 /HPF
PDW BLD-RTO: 15.9 % (ref 11.7–14.9)
PH, URINE: 6 (ref 5–8)
PLATELET # BLD: 193 K/CU MM (ref 140–440)
PMV BLD AUTO: 11 FL (ref 7.5–11.1)
POTASSIUM SERPL-SCNC: 4.3 MMOL/L (ref 3.5–5.1)
PROTEIN UA: 30 MG/DL
RBC # BLD: 3.72 M/CU MM (ref 4.6–6.2)
RBC URINE: ABNORMAL /HPF (ref 0–3)
SODIUM BLD-SCNC: 136 MMOL/L (ref 135–145)
SPECIFIC GRAVITY UA: 1.01 (ref 1–1.03)
SQUAMOUS EPITHELIAL: <1 /HPF
TOTAL PROTEIN: 6.1 GM/DL (ref 6.4–8.2)
TRICHOMONAS: ABNORMAL /HPF
UROBILINOGEN, URINE: NEGATIVE MG/DL (ref 0.2–1)
WBC # BLD: 7.8 K/CU MM (ref 4–10.5)
WBC UA: ABNORMAL /HPF (ref 0–2)

## 2021-04-28 PROCEDURE — 80053 COMPREHEN METABOLIC PANEL: CPT

## 2021-04-28 PROCEDURE — 36415 COLL VENOUS BLD VENIPUNCTURE: CPT

## 2021-04-28 PROCEDURE — 85027 COMPLETE CBC AUTOMATED: CPT

## 2021-04-29 LAB
CULTURE: NORMAL
Lab: NORMAL
SPECIMEN: NORMAL

## 2021-05-07 NOTE — PROGRESS NOTES
Hospitalist Progress Note      Name:  Keith Oh /Age/Sex: 1921  (80 y.o. male)   MRN & CSN:  [id number] & [id number] Admission Date/Time: 3/20/2021  3:33 PM   Location:  -A PCP: Odell Zimmerman MD         Hospital Day: 3     ASSESSMENT/PLAN:  Keith Oh is a 80 y.o.  male who presented with worsening generalized weakness. Hypoxic with SPO2 in the 80s while on 4 L of oxygen in the ED per admission HPI. COVID-19 positive on 3/20/2021. CT chest without central or segmental PE.  CT chest with multifocal airspace disease. #.  Acute hypoxic respiratory failure & multifocal COVID-19 pneumonia--requiring up to 4 L of nasal cannula. Overall stable. Procalcitonin at 1.48. Not a candidate for remdesivir due to CrCl <30. Eddie Breen (daughter) provided consent for plasma per admission HPI. S/p 2 unit plasma on 3/21.  -Ceftriaxone/azithromycin  -Strep urine antigen, Legionella urine antigen, MRSA nasal swab  -Eliquis 2.5 twice daily given poor kidney function (in place of Lovenox)  -Decadron 6 mg x 10 days    #. CKD III--CR at baseline  -BMP daily    #. BPH/enlarged prostate---on Flomax    #. Distal Esophageal wall thickening---noted on CT abdomen. Likely GERD related. Evaluated by GI. Plan for outpatient EGD in 2 weeks after discharge.  -Protonix 40 p.o. daily    #. Hypertension---BP controlled     #. Glaucoma---on eyedrops    #. Asymptomatic bradycardia---blood pressure stable.  -Avoid beta-blocker and calcium channel blockers  -2D echo    #. Pericardial effusion---2D echo pending      MehreenEddie      Child    [phone]     MEDICAL DECISION MAKING:  -Labs reviewed  -Imaging reviewed  -Level of risk moderate  Diet DIET CARB CONTROL;   DVT Prophylaxis [] Lovenox, [x] Eliquis, [] SCDs, [] Ambulation   GI Prophylaxis [] PPI,  [] H2 Blocker,  [] Carafate,  [] Diet/Tube Feeds   Code Status Full Code   Disposition  home or SNF.    MDM [] Low, [x] Moderate,[]  High One time dose valium given for severe anxiety Chief complaint/Interval History/ROS     Chief Complaint: Generalized weakness    INTERVAL HISTORY:      3/22: Remains on nasal cannula. Overall stable. Awaiting PT OT evaluation for proper disposition. 3/21: Conversational.  Eating breakfast.  Not confused at all. Nontoxic-appearing. On 4 L of nasal cannula at the time of encounter. Updated the daughter, Lorrin Fothergill on the morning of 3/21. She is okay for the patient to go to skilled facility if that is recommended by PT.    ROS:  No chest pain. No abdominal pain. Objective: Intake/Output Summary (Last 24 hours) at 3/22/2021 1118  Last data filed at 3/21/2021 1844  Gross per 24 hour   Intake 998 ml   Output --   Net 998 ml      Vitals:   Vitals:    03/22/21 0903   BP: (!) 147/70   Pulse: 61   Resp: 26   Temp:    SpO2: 92%     Physical Exam:   GEN: Awake male, nontoxic appearing. Pleasant. Answers questions appropriately. EYES: No eye discharge. HENT: Mucous membranes moist.  No nasal discharge. NECK: Supple  RESP: Symmetric breath sounds. No accessory muscle use. Suboptimal air movement. No wheezing. CV: Regular rhythm. Bradycardic. No pitting lower extremity edema. GI: Abdomen soft. Nontender. Nondistended. : No Giagn in place. MSK: No bony fractures. No gross deformities.   SKIN: warm, dry, no rashes,  NEURO: Cranial nerves appear grossly intact, normal speech,  PSYCH: Awake, alert, oriented     Medications:   Medications:    pantoprazole  40 mg Oral QAM AC    vitamin D  2,000 Units Oral Daily    sodium chloride flush  10 mL Intravenous 2 times per day    enoxaparin  30 mg Subcutaneous BID    dexamethasone  6 mg Oral Daily    [Held by provider] carvedilol  25 mg Oral BID WC    [Held by provider] furosemide  20 mg Oral Daily    losartan  100 mg Oral Daily    levothyroxine  50 mcg Oral Daily    tamsulosin  0.4 mg Oral Daily    atorvastatin  10 mg Oral Daily    latanoprost  1 drop Both Eyes Nightly      Infusions:    sodium chloride       PRN Meds: sodium chloride flush, 10 mL, PRN  promethazine, 12.5 mg, Q6H PRN    Or  ondansetron, 4 mg, Q6H PRN  polyethylene glycol, 17 g, Daily PRN  acetaminophen, 650 mg, Q6H PRN    Or  acetaminophen, 650 mg, Q6H PRN  sodium chloride, , PRN      Electronically signed by Penne Carrel, MD on 3/22/2021 at 11:18 AM

## 2023-11-10 NOTE — PROGRESS NOTES
This nurse having difficulty obtaining accurate temperature on pt. Placed rectal thermometer probe and pt temp 93.3. Moriah arteaga placed on pt and Dr. Karen Adams notified. 10-Nov-2023 15:28